# Patient Record
Sex: FEMALE | Race: WHITE | NOT HISPANIC OR LATINO | Employment: UNEMPLOYED | URBAN - METROPOLITAN AREA
[De-identification: names, ages, dates, MRNs, and addresses within clinical notes are randomized per-mention and may not be internally consistent; named-entity substitution may affect disease eponyms.]

---

## 2023-08-22 ENCOUNTER — NURSING HOME VISIT (OUTPATIENT)
Dept: POST ACUTE CARE | Facility: EXTERNAL LOCATION | Age: 58
End: 2023-08-22
Payer: COMMERCIAL

## 2023-08-22 DIAGNOSIS — R52 PAIN: ICD-10-CM

## 2023-08-22 DIAGNOSIS — G47.00 INSOMNIA, UNSPECIFIED TYPE: ICD-10-CM

## 2023-08-22 DIAGNOSIS — Z86.73 HISTORY OF LACUNAR CEREBROVASCULAR ACCIDENT: ICD-10-CM

## 2023-08-22 DIAGNOSIS — R53.81 PHYSICAL DECONDITIONING: ICD-10-CM
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
## 2023-08-22 DIAGNOSIS — Z79.899 ON DEEP VEIN THROMBOSIS (DVT) PROPHYLAXIS: ICD-10-CM

## 2023-08-22 DIAGNOSIS — R30.0 DYSURIA: ICD-10-CM

## 2023-08-22 DIAGNOSIS — I10 HYPERTENSION, ESSENTIAL: ICD-10-CM

## 2023-08-22 DIAGNOSIS — E11.9 TYPE 2 DIABETES MELLITUS WITHOUT COMPLICATION, WITHOUT LONG-TERM CURRENT USE OF INSULIN (MULTI): ICD-10-CM

## 2023-08-22 DIAGNOSIS — F32.A DEPRESSION, UNSPECIFIED DEPRESSION TYPE: ICD-10-CM

## 2023-08-22 DIAGNOSIS — E78.5 DYSLIPIDEMIA: ICD-10-CM

## 2023-08-22 DIAGNOSIS — K59.00 CONSTIPATION, UNSPECIFIED CONSTIPATION TYPE: ICD-10-CM

## 2023-08-22 DIAGNOSIS — M62.838 MUSCLE SPASM: ICD-10-CM

## 2023-08-22 PROCEDURE — 99309 SBSQ NF CARE MODERATE MDM 30: CPT | Performed by: NURSE PRACTITIONER
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
## 2023-08-23 VITALS
DIASTOLIC BLOOD PRESSURE: 88 MMHG | WEIGHT: 185 LBS | HEART RATE: 87 BPM | TEMPERATURE: 98.2 F | RESPIRATION RATE: 15 BRPM | OXYGEN SATURATION: 95 % | SYSTOLIC BLOOD PRESSURE: 132 MMHG

## 2023-08-23 PROBLEM — E78.5 DYSLIPIDEMIA: Status: ACTIVE | Noted: 2022-12-28

## 2023-08-23 PROBLEM — F32.A DEPRESSION: Status: ACTIVE | Noted: 2022-06-29

## 2023-08-23 PROBLEM — Z79.899 ON DEEP VEIN THROMBOSIS (DVT) PROPHYLAXIS: Status: ACTIVE | Noted: 2023-08-23
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
## 2023-08-23 PROBLEM — M62.838 MUSCLE SPASM: Status: ACTIVE | Noted: 2023-08-23

## 2023-08-23 PROBLEM — I44.7 LBBB (LEFT BUNDLE BRANCH BLOCK): Status: ACTIVE | Noted: 2022-07-18

## 2023-08-23 PROBLEM — R47.01 EXPRESSIVE APHASIA: Status: ACTIVE | Noted: 2023-02-27

## 2023-08-23 PROBLEM — F39 MOOD DISORDER (CMS-HCC): Status: ACTIVE | Noted: 2022-06-29

## 2023-08-23 PROBLEM — R52 PAIN: Status: ACTIVE | Noted: 2023-08-23

## 2023-08-23 PROBLEM — R30.0 DYSURIA: Status: ACTIVE | Noted: 2023-08-23

## 2023-08-23 PROBLEM — R53.81 PHYSICAL DECONDITIONING: Status: ACTIVE | Noted: 2023-08-23

## 2023-08-23 PROBLEM — K59.00 CONSTIPATION: Status: ACTIVE | Noted: 2023-08-23

## 2023-08-23 PROBLEM — G47.00 INSOMNIA: Status: ACTIVE | Noted: 2023-08-23

## 2023-08-23 PROBLEM — F41.9 CHRONIC ANXIETY: Status: ACTIVE | Noted: 2023-05-31

## 2023-08-23 PROBLEM — Z86.73 HISTORY OF LACUNAR CEREBROVASCULAR ACCIDENT: Status: ACTIVE | Noted: 2023-02-27

## 2023-08-23 PROBLEM — E11.9 TYPE 2 DIABETES MELLITUS WITHOUT COMPLICATION, WITHOUT LONG-TERM CURRENT USE OF INSULIN (MULTI): Status: ACTIVE | Noted: 2023-08-23

## 2023-08-23 NOTE — ASSESSMENT & PLAN NOTE
Oxycodone 10 mg by mouth every 6 hrs as needed x 14 days   Acetaminophen 650 mg by mouth every 6 hrs as needed
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
## 2023-08-23 NOTE — ASSESSMENT & PLAN NOTE
Lovenox 30 mg subcutaneous BID  BLE Venous Doppler 8/17/2023 (-)   Repeat LLE Venous doppler (Increasing left calf pain)
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
## 2023-08-29 ENCOUNTER — NURSING HOME VISIT (OUTPATIENT)
Dept: POST ACUTE CARE | Facility: EXTERNAL LOCATION | Age: 58
End: 2023-08-29
Payer: COMMERCIAL

## 2023-08-29 DIAGNOSIS — E11.9 TYPE 2 DIABETES MELLITUS WITHOUT COMPLICATION, WITHOUT LONG-TERM CURRENT USE OF INSULIN (MULTI): ICD-10-CM

## 2023-08-29 DIAGNOSIS — N30.00 ACUTE CYSTITIS WITHOUT HEMATURIA: ICD-10-CM

## 2023-08-29 DIAGNOSIS — I10 HYPERTENSION, ESSENTIAL: Primary | ICD-10-CM
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
## 2023-08-29 DIAGNOSIS — R53.81 PHYSICAL DECONDITIONING: ICD-10-CM

## 2023-08-29 PROCEDURE — 99308 SBSQ NF CARE LOW MDM 20: CPT | Performed by: NURSE PRACTITIONER
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
## 2023-08-31 VITALS
OXYGEN SATURATION: 96 % | TEMPERATURE: 98 F | WEIGHT: 183 LBS | RESPIRATION RATE: 16 BRPM | SYSTOLIC BLOOD PRESSURE: 142 MMHG | HEART RATE: 78 BPM | DIASTOLIC BLOOD PRESSURE: 80 MMHG

## 2023-08-31 PROBLEM — N30.00 ACUTE CYSTITIS WITHOUT HEMATURIA: Status: ACTIVE | Noted: 2023-08-31

## 2023-08-31 NOTE — ASSESSMENT & PLAN NOTE
Tirzepatide 10 mg subcutaneous every Monday    Glucose checks BID (Before breakfast and dinner) and chart in PCC
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
## 2023-08-31 NOTE — ASSESSMENT & PLAN NOTE
UA positive  Insufficient amt urine to run culture  Macrobid 100 mg by mouth BID (Stop date 9/3/2023)
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
## 2023-10-09 ENCOUNTER — APPOINTMENT (OUTPATIENT)
Dept: ORTHOPEDIC SURGERY | Facility: CLINIC | Age: 58
End: 2023-10-09
Payer: COMMERCIAL

## 2023-10-13 PROBLEM — S32.9XXA PELVIC FRACTURE (MULTI): Status: ACTIVE | Noted: 2023-10-13

## 2023-10-13 PROBLEM — S32.402D: Status: ACTIVE | Noted: 2023-10-13

## 2023-10-13 PROBLEM — S22.41XD CLOSED FRACTURE OF MULTIPLE RIBS OF RIGHT SIDE WITH ROUTINE HEALING: Status: ACTIVE | Noted: 2023-10-13

## 2023-10-13 PROBLEM — T80.1XXS: Status: ACTIVE | Noted: 2023-10-13

## 2023-10-13 PROBLEM — S32.401D: Status: ACTIVE | Noted: 2023-10-13

## 2023-10-13 RX ORDER — DULOXETIN HYDROCHLORIDE 30 MG/1
30 CAPSULE, DELAYED RELEASE ORAL DAILY
COMMUNITY
Start: 2023-07-28

## 2023-10-13 RX ORDER — LISINOPRIL AND HYDROCHLOROTHIAZIDE 10; 12.5 MG/1; MG/1
1 TABLET ORAL DAILY
COMMUNITY

## 2023-10-13 RX ORDER — SENNOSIDES 8.6 MG/1
TABLET ORAL
COMMUNITY

## 2023-10-13 RX ORDER — ACETAMINOPHEN 325 MG/1
TABLET ORAL
COMMUNITY

## 2023-10-13 RX ORDER — ATORVASTATIN CALCIUM 80 MG/1
80 TABLET, FILM COATED ORAL DAILY
COMMUNITY
Start: 2023-02-21

## 2023-10-13 RX ORDER — OMEPRAZOLE 20 MG/1
20 CAPSULE, DELAYED RELEASE ORAL AS NEEDED
COMMUNITY
Start: 2022-12-08

## 2023-10-13 RX ORDER — ALBUTEROL SULFATE 90 UG/1
2 AEROSOL, METERED RESPIRATORY (INHALATION)
COMMUNITY
Start: 2016-03-21

## 2023-10-13 RX ORDER — METHOCARBAMOL 500 MG/1
TABLET, FILM COATED ORAL
COMMUNITY

## 2023-10-13 RX ORDER — DULOXETIN HYDROCHLORIDE 60 MG/1
60 CAPSULE, DELAYED RELEASE ORAL DAILY
COMMUNITY

## 2023-10-13 RX ORDER — SODIUM CHLORIDE 0.9 % (FLUSH) 0.9 %
SYRINGE (ML) INJECTION
COMMUNITY
Start: 2023-02-21 | End: 2024-05-22

## 2023-10-13 RX ORDER — IPRATROPIUM BROMIDE AND ALBUTEROL SULFATE 2.5; .5 MG/3ML; MG/3ML
3 SOLUTION RESPIRATORY (INHALATION) 4 TIMES DAILY PRN
COMMUNITY

## 2023-10-13 RX ORDER — ATOMOXETINE 25 MG/1
25 CAPSULE ORAL EVERY MORNING
COMMUNITY
Start: 2022-11-06

## 2023-10-13 RX ORDER — ENOXAPARIN SODIUM 100 MG/ML
INJECTION SUBCUTANEOUS
COMMUNITY

## 2023-10-13 RX ORDER — ASPIRIN 81 MG/1
81 TABLET ORAL DAILY
COMMUNITY
Start: 2023-01-10

## 2023-10-13 RX ORDER — TIRZEPATIDE 2.5 MG/.5ML
2.5 INJECTION, SOLUTION SUBCUTANEOUS
COMMUNITY
Start: 2023-01-10

## 2023-10-13 RX ORDER — ESCITALOPRAM OXALATE 10 MG/1
1 TABLET ORAL DAILY
COMMUNITY

## 2023-10-13 RX ORDER — BUPRENORPHINE HYDROCHLORIDE AND NALOXONE HYDROCHLORIDE DIHYDRATE 8; 2 MG/1; MG/1
1 TABLET SUBLINGUAL 2 TIMES DAILY
COMMUNITY

## 2023-10-13 RX ORDER — TIRZEPATIDE 5 MG/.5ML
5 INJECTION, SOLUTION SUBCUTANEOUS
COMMUNITY
Start: 2023-03-21

## 2023-10-13 RX ORDER — POLYETHYLENE GLYCOL 3350 17 G/17G
POWDER, FOR SOLUTION ORAL
COMMUNITY

## 2023-10-13 RX ORDER — OXYCODONE HCL 10 MG/1
TABLET, FILM COATED, EXTENDED RELEASE ORAL
COMMUNITY

## 2023-10-13 RX ORDER — METFORMIN HYDROCHLORIDE 500 MG/1
2 TABLET, EXTENDED RELEASE ORAL
COMMUNITY
Start: 2022-12-09

## 2023-10-13 RX ORDER — TALC
POWDER (GRAM) TOPICAL
COMMUNITY

## 2023-10-13 RX ORDER — IPRATROPIUM BROMIDE 0.5 MG/2.5ML
2.5 SOLUTION RESPIRATORY (INHALATION) 4 TIMES DAILY
COMMUNITY
Start: 2013-07-29

## 2023-10-13 RX ORDER — ATOMOXETINE 40 MG/1
40 CAPSULE ORAL EVERY MORNING
COMMUNITY
Start: 2023-03-23

## 2023-10-13 RX ORDER — PROPRANOLOL HYDROCHLORIDE 60 MG/1
60 CAPSULE, EXTENDED RELEASE ORAL DAILY
COMMUNITY
Start: 2023-06-29

## 2023-10-13 RX ORDER — LOSARTAN POTASSIUM 25 MG/1
3 TABLET ORAL DAILY
COMMUNITY
Start: 2023-02-28

## 2023-10-13 RX ORDER — OMEPRAZOLE 40 MG/1
40 CAPSULE, DELAYED RELEASE ORAL DAILY
COMMUNITY

## 2023-10-16 ENCOUNTER — OFFICE VISIT (OUTPATIENT)
Dept: ORTHOPEDIC SURGERY | Facility: CLINIC | Age: 58
End: 2023-10-16
Payer: COMMERCIAL

## 2023-10-16 ENCOUNTER — APPOINTMENT (OUTPATIENT)
Dept: ORTHOPEDIC SURGERY | Facility: CLINIC | Age: 58
End: 2023-10-16
Payer: COMMERCIAL

## 2023-10-16 ENCOUNTER — ANCILLARY PROCEDURE (OUTPATIENT)
Dept: RADIOLOGY | Facility: CLINIC | Age: 58
End: 2023-10-16
Payer: COMMERCIAL

## 2023-10-16 VITALS — HEIGHT: 59 IN | WEIGHT: 183 LBS | BODY MASS INDEX: 36.89 KG/M2

## 2023-10-16 DIAGNOSIS — S32.432D CLOSED DISPLACED FRACTURE OF ANTERIOR COLUMN OF LEFT ACETABULUM WITH ROUTINE HEALING: ICD-10-CM

## 2023-10-16 DIAGNOSIS — Z87.81 HISTORY OF PELVIC FRACTURE: ICD-10-CM

## 2023-10-16 DIAGNOSIS — S32.82XD MULTIPLE CLOSED UNSTABLE LATERAL COMPRESSION FRACTURES OF PELVIS WITH ROUTINE HEALING: Primary | ICD-10-CM

## 2023-10-16 PROBLEM — S61.402A OPEN WOUND OF LEFT HAND: Status: ACTIVE | Noted: 2023-09-18

## 2023-10-16 PROBLEM — L03.90 WOUND CELLULITIS: Status: ACTIVE | Noted: 2023-09-10

## 2023-10-16 PROBLEM — J18.9 COMMUNITY ACQUIRED PNEUMONIA OF RIGHT LOWER LOBE OF LUNG: Status: ACTIVE | Noted: 2023-10-02

## 2023-10-16 PROCEDURE — 72190 X-RAY EXAM OF PELVIS: CPT | Performed by: RADIOLOGY

## 2023-10-16 PROCEDURE — 1036F TOBACCO NON-USER: CPT | Performed by: ORTHOPAEDIC SURGERY

## 2023-10-16 PROCEDURE — 99024 POSTOP FOLLOW-UP VISIT: CPT | Performed by: ORTHOPAEDIC SURGERY

## 2023-10-16 PROCEDURE — 4010F ACE/ARB THERAPY RXD/TAKEN: CPT | Performed by: ORTHOPAEDIC SURGERY

## 2023-10-16 PROCEDURE — 72190 X-RAY EXAM OF PELVIS: CPT | Mod: FY

## 2023-10-16 ASSESSMENT — PAIN - FUNCTIONAL ASSESSMENT: PAIN_FUNCTIONAL_ASSESSMENT: NO/DENIES PAIN

## 2023-10-16 NOTE — PROGRESS NOTES
Subjective    Patient ID: Ashley Kessler is a 58 y.o. female.    Chief Complaint: Follow-up of the Pelvis     Last Surgery: Percutaneous posterior pelvic fixation and anterior external fixator application by Dr. Quintero on August 5, 2023 and external fixator removal and right acetabular Open fixation of the left percutaneous acetabular fixation by Dr. May on August 10, 2023       HPI  Patient is doing well.  She has been compliant nonweightbearing.  Patient reported that she longer has any pain.  She is currently home.  She is anxious to begin weightbearing.  No numbness or tingling down the leg.  Overall she is very happy  Objective   Ortho Exam  Gen: The patient is alert and oriented ×3, is in no acute distress, and appear their stated age and weight.      Patient sitting in the wheelchair today.  Her pelvic is stable.  No pain with compression of the pelvis.  No pain or rotation range of motion of the hip.  Incisions are well-healed.  Normal movement and sensation in the leg.  Image Results:  I personally reviewed her pelvic radiograph today shows stable fixation of the pelvis.  Fracture line no longer visible.  There is interval callus formation.  No hip arthrosis.  Assessment/Plan   Encounter Diagnoses:  History of pelvic fracture  Patient has radiographic healing of fracture.  At this time she may begin weightbearing as tolerated using a walker.  I referred her to outpatient physical therapy for range of motion and strengthening exercises.  She may transition from a assistive device as tolerated.  Distress importance of fall prevention at home.  Patient will come back and see me in about 2 months to obtain x-ray of the pelvis 5 views.  Orders Placed This Encounter    XR pelvis 3+ views     No follow-ups on file.
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
## 2023-10-25 ENCOUNTER — EVALUATION (OUTPATIENT)
Dept: PHYSICAL THERAPY | Facility: CLINIC | Age: 58
End: 2023-10-25
Payer: COMMERCIAL

## 2023-10-25 DIAGNOSIS — R52 PAIN: Primary | ICD-10-CM

## 2023-10-25 DIAGNOSIS — R53.81 PHYSICAL DECONDITIONING: ICD-10-CM

## 2023-10-25 DIAGNOSIS — S32.9XXA: ICD-10-CM

## 2023-10-25 PROCEDURE — 97163 PT EVAL HIGH COMPLEX 45 MIN: CPT | Mod: GP | Performed by: PHYSICAL THERAPIST
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
## 2023-10-25 NOTE — Clinical Note
October 25, 2023     Patient: Ashley Kessler   YOB: 1965   Date of Visit: 10/25/2023       To Whom It May Concern:    It is my medical opinion that Ashley Kessler {Work release (duty restriction):25623}.    If you have any questions or concerns, please don't hesitate to call.         Sincerely,        Catarina Pickering, PT    CC: No Recipients

## 2023-10-25 NOTE — Clinical Note
October 25, 2023     Patient: Ashley Kessler   YOB: 1965   Date of Visit: 10/25/2023       To Whom it May Concern:    Ashley Kessler was seen in my clinic on 10/25/2023. She {Return to school/sport:18143}.    If you have any questions or concerns, please don't hesitate to call.         Sincerely,          Catarina Pickering, PT        CC: No Recipients

## 2023-11-01 ENCOUNTER — DOCUMENTATION (OUTPATIENT)
Dept: PHYSICAL THERAPY | Facility: CLINIC | Age: 58
End: 2023-11-01
Payer: COMMERCIAL

## 2023-11-01 NOTE — PROGRESS NOTES
[facility]                 Therapy Communication Note    Patient Name: Ashley Kessler  MRN: [id number]  Today's Date: 11/1/2023     Discipline: Physical Therapy    Missed Visit Reason:      Missed Time: No Show    Comment:

## 2023-11-07 ENCOUNTER — TREATMENT (OUTPATIENT)
Dept: PHYSICAL THERAPY | Facility: CLINIC | Age: 58
End: 2023-11-07
Payer: COMMERCIAL

## 2023-11-07 DIAGNOSIS — S32.9XXA: ICD-10-CM

## 2023-11-07 DIAGNOSIS — R53.81 PHYSICAL DECONDITIONING: ICD-10-CM

## 2023-11-07 DIAGNOSIS — R52 PAIN: ICD-10-CM

## 2023-11-07 PROCEDURE — 97140 MANUAL THERAPY 1/> REGIONS: CPT | Mod: GP | Performed by: PHYSICAL THERAPIST

## 2023-11-07 PROCEDURE — 97110 THERAPEUTIC EXERCISES: CPT | Mod: GP | Performed by: PHYSICAL THERAPIST

## 2023-11-07 NOTE — PROGRESS NOTES
Physical Therapy Treatment     Patient Name: Ashley Kessler  MRN: [id number]  Today's Date: 11/7/2023  Time Calculation  Start Time: 1300  Stop Time: 1345  Time Calculation (min): 45 min    Visit Number:  2       Current Problem  1. Pelvic fracture (CMS/HCC)  Follow Up In Physical Therapy      2. Pain  Follow Up In Physical Therapy      3. Physical deconditioning  Follow Up In Physical Therapy          Precautions   WBAT    Pain   0/10    Subjective/General   The pt had a good day and was able to walk a lot without the walker, but was tired and in bed most of the next day.    Balance is good good, but sometimes lacks confidence    Missed last session due to no power    Objective  Treatment:    Therapeutic Exercise:  35 minutes  Recumbent biking : 3 minutes at level 1    Slant board 3 x30 seconds  Hamstring stretch 3 x30 seconds each    Seated LE exercise for strengthening   LAQ 1 x10 with 2 #  Marching 1 x10 2 #  Hamstring curl  2 x10  Abduction with Green theraband  2 x10  ball squeeze  2 x10  ankle df/pf  2 x10    Standing:  with UE support for strengthening:  Hip flexion/marching   1 x10 with 0 #  Hip extension 1 x10 with 0 #  Hip abduction 1 x10 with 0 #      Air ex foam  Small squat 1 x10   Toe raise1 x10   Eyes closed x2  Tapping hands to target      Manual Therapy:  10 minutes  STM to B quads in supported hook lying and hip flexor release      OP EDUCATION:   Heat as needed     Assessment:     Thept was able to do all exercises with few rests, but was stiff and sore after.  Able to stand fairly erect, but not lay flat in supine    End of session pain rating:  mild    Plan:  Progress as tolerated    Assessment of current progress against goals:  Insufficient treatment time to assess progress  Goals:   +  Increased hip ROM with 0 extension,,  45 ER, and 0 degrees knee extension  +  Able to perform sit to stand and bed mobility independently without UE support and without increased pain  +  The pt will have 5/5  strength in the LE's as needed for hip stability in function  +  The pt will ambulate with least restrictive device  for 300 feet modified independently   +  The pt will be independent in ascending and descending stairs as needed to enter her home with least restrictive device independently in home without increased pain.   +  Improve LE flexibility in hamstrings and hip flexors to reduce stress on the joints and spine  + Reduce soft tissue restriction as needed to reduce pain and prevent further injury  + Independent in [facility] for long term self-management and overall health and well being   + assess balance and achieve low fall risk on Tinetti or Coffey

## 2023-11-09 ENCOUNTER — TREATMENT (OUTPATIENT)
Dept: PHYSICAL THERAPY | Facility: CLINIC | Age: 58
End: 2023-11-09
Payer: COMMERCIAL

## 2023-11-09 DIAGNOSIS — S32.9XXA: ICD-10-CM

## 2023-11-09 DIAGNOSIS — R53.81 PHYSICAL DECONDITIONING: ICD-10-CM

## 2023-11-09 DIAGNOSIS — R52 PAIN: ICD-10-CM

## 2023-11-09 PROCEDURE — 97110 THERAPEUTIC EXERCISES: CPT | Mod: CQ,GP

## 2023-11-09 PROCEDURE — 97140 MANUAL THERAPY 1/> REGIONS: CPT | Mod: GP,CQ

## 2023-11-09 NOTE — PROGRESS NOTES
Physical Therapy    Physical Therapy Treatment    Patient Name: Ashley Kessler  MRN: [id number]  Today's Date: 11/9/2023        Patient Name: Ashley Kessler  MRN: [id number]  Time Calculation  Start Time: 1145  Stop Time: 1230  Time Calculation (min): 45 min     Visit Number:  3         Current Problem  1. Pelvic fracture (CMS/HCC)  Follow Up In Physical Therapy      2. Pain  Follow Up In Physical Therapy      3. Physical deconditioning  Follow Up In Physical Therapy        Precautions   WBAT     Pain   0/10     Subjective/General   The pt had a good day and was able to walk a lot without the walker, but was tired and in bed most of the next day.    Balance is good good, but sometimes lacks confidence  L hip discomfort noted but not painful mostly with amb     ObjectiveObjective  Treatment:     Therapeutic Exercise:  30 minutes  Recumbent biking : 3 minutes at level 1     Slant board 3 x30 seconds  Hamstring stretch 3 x30 seconds each     Seated LE exercise for strengthening   LAQ 1 x10 with 2 #  Marching 1 x10 2 #  Hamstring curl  2 x10  Abduction with Green theraband  2 x10  ball squeeze  2 x10  ankle df/pf  2 x10     Standing:  with UE support for strengthening:  Hip flexion/marching   1 x10 with 0 #  Hip extension 1 x10 with 0 #  Hip abduction 1 x10 with 0 #  Mini squats/ toe raises      Air ex foam  Small squat 1 x10   Toe raise1 x10   Eyes closed x2  Tapping hands to target   Supine: SAQ, heel slides , SLR add-abd, hooklying LTR, marching 1 x 10 each  Manual Therapy:  15 minutes  STM to B quads in supported hook lying and hip flexor release    OP EDUCATION:   Heat as needed      Assessment:  Thept was able to do all exercises with few rests, but was stiff and sore after.  Able to stand fairly erect, but not lay flat in supine ,  slight discomfort with SLR due to weakness     End of session pain rating:  mild     Plan:  Progress as tolerated         End of session pain rating:  mild     Plan:  Progress as tolerated      Goals:   +  Increased hip ROM with 0 extension,,  45 ER, and 0 degrees knee extension  +  Able to perform sit to stand and bed mobility independently without UE support and without increased pain  +  The pt will have 5/5 strength in the LE's as needed for hip stability in function  +  The pt will ambulate with least restrictive device  for 300 feet modified independently   +  The pt will be independent in ascending and descending stairs as needed to enter her home with least restrictive device independently in home without increased pain.   +  Improve LE flexibility in hamstrings and hip flexors to reduce stress on the joints and spine  + Reduce soft tissue restriction as needed to reduce pain and prevent further injury  + Independent in [facility] for long term self-management and overall health and well being   + assess balance and achieve low fall risk on Tinetti or Coffey

## 2023-11-14 ENCOUNTER — APPOINTMENT (OUTPATIENT)
Dept: PHYSICAL THERAPY | Facility: CLINIC | Age: 58
End: 2023-11-14
Payer: COMMERCIAL

## 2023-11-16 ENCOUNTER — TREATMENT (OUTPATIENT)
Dept: PHYSICAL THERAPY | Facility: CLINIC | Age: 58
End: 2023-11-16
Payer: COMMERCIAL

## 2023-11-16 DIAGNOSIS — S32.9XXA: ICD-10-CM

## 2023-11-16 DIAGNOSIS — R53.81 PHYSICAL DECONDITIONING: ICD-10-CM

## 2023-11-16 DIAGNOSIS — R52 PAIN: ICD-10-CM

## 2023-11-16 PROCEDURE — 97110 THERAPEUTIC EXERCISES: CPT | Mod: GP | Performed by: PHYSICAL THERAPIST

## 2023-11-16 NOTE — PROGRESS NOTES
Physical Therapy Treatment     Patient Name: Ashley Kessler  MRN: [id number]  Today's Date: 11/16/2023  Time Calculation  Start Time: 1315  Stop Time: 1345  Time Calculation (min): 30 min    Visit Number:  4       Current Problem  1. Pelvic fracture (CMS/HCC)  Follow Up In Physical Therapy      2. Pain  Follow Up In Physical Therapy      3. Physical deconditioning  Follow Up In Physical Therapy          Precautions   WBAT    Pain   0/10  Pain ranges  from 0/10  to 4/10    Subjective/General   She decided to get rid of the walker. She wakes up with pain, but it works out as she walks.  She hopes to start working from home on Monday.     Yesterday she had intense pulling in the hamstrings.She woke up with the R knee hurting a lot.        Objective  Pt 15 minutes late to therapy but agreeable to short treatment    Treatment:  Exercises 30 mintues  Recumbent biking : 5 minutes at level 1 seat 7     Slant board 3 x30 seconds  Hamstring stretch 3 x30 seconds each     Seated LE exercise for strengthening   LAQ 1 x10 with 0 #  Marching 1 x10 BTB  Hamstring curl  2 x10  Abduction with Blue theraband  2 x10  ball squeeze  2 x10  ankle df/pf  2 x10     Standing:  with UE support for strengthening:  Hip flexion/marching   1 x10 with 0 #  Hip extension 1 x10 with BTB  Hip abduction 1 x10 with BTB  Mini squats/ toe raises      Air ex foam  +Head nods and turns   + Eyes closed x2    Assessment:   The pt has huge progress and entered PT with no device.  Exercise progressed with resistance added to exercise.  She is apropriate at this time to return to work from home with the ability to move around as needed.      End of session pain rating:  not increased    Plan:  Progress as tolerated        Assessment of current progress against goals:  Progressing toward functional goals  Goals:    +  Increased hip ROM with 0 extension,,  45 ER, and 0 degrees knee extension  +  Able to perform sit to stand and bed mobility independently without UE  support and without increased pain  +  The pt will have 5/5 strength in the LE's as needed for hip stability in function  +  The pt will ambulate with least restrictive device  for 300 feet modified independently   +  The pt will be independent in ascending and descending stairs as needed to enter her home with least restrictive device independently in home without increased pain.   +  Improve LE flexibility in hamstrings and hip flexors to reduce stress on the joints and spine  + Reduce soft tissue restriction as needed to reduce pain and prevent further injury  + Independent in [facility] for long term self-management and overall health and well being   + assess balance and achieve low fall risk on Tinetti or Coffey

## 2023-11-20 ENCOUNTER — APPOINTMENT (OUTPATIENT)
Dept: PHYSICAL THERAPY | Facility: CLINIC | Age: 58
End: 2023-11-20
Payer: COMMERCIAL

## 2023-11-22 ENCOUNTER — APPOINTMENT (OUTPATIENT)
Dept: PHYSICAL THERAPY | Facility: CLINIC | Age: 58
End: 2023-11-22
Payer: COMMERCIAL

## 2023-11-28 ENCOUNTER — APPOINTMENT (OUTPATIENT)
Dept: PHYSICAL THERAPY | Facility: CLINIC | Age: 58
End: 2023-11-28
Payer: COMMERCIAL

## 2023-11-28 ENCOUNTER — DOCUMENTATION (OUTPATIENT)
Dept: PHYSICAL THERAPY | Facility: CLINIC | Age: 58
End: 2023-11-28

## 2023-11-28 NOTE — PROGRESS NOTES
Physical Therapy                 Therapy Communication Note    Patient Name: Ashley Kessler  MRN: [id number]  Today's Date: 11/28/2023     Discipline: Physical Therapy    Missed Visit Reason:      Missed Time: No Show    Comment: no call, but bad weather today

## 2023-12-05 ENCOUNTER — DOCUMENTATION (OUTPATIENT)
Dept: PHYSICAL THERAPY | Facility: CLINIC | Age: 58
End: 2023-12-05

## 2023-12-05 NOTE — PROGRESS NOTES
Physical Therapy                 Therapy Communication Note    Patient Name: Ashley Kessler  MRN: [id number]  Today's Date: 12/5/2023     Discipline: Physical Therapy    Missed Visit Reason:      Missed Time: No Show    Comment:

## 2024-01-08 ENCOUNTER — ANCILLARY PROCEDURE (OUTPATIENT)
Dept: RADIOLOGY | Facility: CLINIC | Age: 59
End: 2024-01-08
Payer: COMMERCIAL

## 2024-01-08 ENCOUNTER — OFFICE VISIT (OUTPATIENT)
Dept: ORTHOPEDIC SURGERY | Facility: CLINIC | Age: 59
End: 2024-01-08
Payer: COMMERCIAL

## 2024-01-08 DIAGNOSIS — Z87.81 HISTORY OF PELVIC FRACTURE: ICD-10-CM

## 2024-01-08 PROCEDURE — 72190 X-RAY EXAM OF PELVIS: CPT

## 2024-01-08 PROCEDURE — 99214 OFFICE O/P EST MOD 30 MIN: CPT | Performed by: ORTHOPAEDIC SURGERY

## 2024-01-08 PROCEDURE — 72190 X-RAY EXAM OF PELVIS: CPT | Performed by: RADIOLOGY

## 2024-01-08 PROCEDURE — 4010F ACE/ARB THERAPY RXD/TAKEN: CPT | Performed by: ORTHOPAEDIC SURGERY

## 2024-01-08 PROCEDURE — 1036F TOBACCO NON-USER: CPT | Performed by: ORTHOPAEDIC SURGERY

## 2024-01-08 RX ORDER — GABAPENTIN 300 MG/1
300 CAPSULE ORAL 3 TIMES DAILY
Qty: 90 CAPSULE | Refills: 0 | Status: SHIPPED | OUTPATIENT
Start: 2024-01-08 | End: 2024-02-07

## 2024-01-08 RX ORDER — NALOXONE HYDROCHLORIDE 4 MG/.1ML
4 SPRAY NASAL AS NEEDED
Qty: 2 EACH | Refills: 0 | Status: SHIPPED | OUTPATIENT
Start: 2024-01-08 | End: 2024-01-09

## 2024-01-08 RX ORDER — CYCLOBENZAPRINE HCL 10 MG
10 TABLET ORAL 3 TIMES DAILY PRN
Qty: 90 TABLET | Refills: 0 | Status: SHIPPED | OUTPATIENT
Start: 2024-01-08 | End: 2024-02-07

## 2024-01-08 NOTE — PROGRESS NOTES
"Subjective    Patient ID: Ashley Kessler is a 58 y.o. female.    Chief Complaint: Follow up of the pelvis     Last Surgery: Percutaneous posterior pelvic fixation and anterior external fixator application by Dr. Quintero on August 5, 2023 and external fixator removal and right acetabular Open fixation of the left percutaneous acetabular fixation by dr. May on August 10th, 2023      HPI  Patient is a 58 y.o. female who is 5 months s/p external fixator removal and right acetabular open fixation of the left percutaneous acetabular fixation.  Date of surgery was 8/10/2023.  Patient is now weight bearing as tolerated. Patient reported new right hip pain and left leg pain after injury in December. Patient stated at [facility] ED that the pain started after \"play fighting\" or wrestling with significant other and he sat on her hips. In the office today, patient states that it occurred after falling in her driveway. Patient states that the pain is sharp and burning on the right anterior thigh and that it is sharp and tingling radiating down the whole leg into the toes on the left leg. States that the pain makes it difficult to walk. Patient denies fever, chills, swelling, shortness of breath, and chest pain.     ROS: All other systems have been reviewed and are negative except as previously noted in history of present illness.      IMP:  Problem List Items Addressed This Visit    None  Visit Diagnoses       History of pelvic fracture        Relevant Medications    cyclobenzaprine (Flexeril) 10 mg tablet    gabapentin (Neurontin) 300 mg capsule    naloxone (Narcan) 4 mg/0.1 mL nasal spray    Other Relevant Orders    XR pelvis 3+ views            Objective   General: Alert and oriented x 3, NAD, respirations easy and unlabored with no audible wheezes, skin warm and dry, speech and dress appropriate for noted age, affect euthymic.     Musculoskeletal: bilateral lower extremities  incisions c/d/i  No swelling to lower " leg  compartments soft  no calf tenderness  sensation intact to light touch  motor intact including TA/GS/EHL  palpable DP/PT pulses 2+   Full ROM    X-ray: Images of 5 view pelvis reviewed personally by me today and reveal maintenance of alignment of lateral compression fractures with hardware in position and no interval change. Severe osteoarthritis of lower lumbar spine is noted.      Assessment/Plan   Encounter Diagnoses:  History of pelvic fracture    PLAN: Patient is 5 months s/p external fixator removal and right acetabular open fixation of the left percutaneous acetabular fixation. Patient had an injury back in December that is now causing her pain in both legs. The pain is sharp and burning and travels down her whole leg in the left leg. Imaging shows stable hardware in position and severe osteoarthritis of the lumbar spine. Patient was educated that this is most likely nerve pain that can be due to the arthritis that is occurring her spine. Patient was educated that gabapentin is a medication that is prescribed for nerve pain and can help to relieve the pain she is currently having. Patient was interested in taking this medication and it was prescribed. As well the patient was having right leg muscle spasms, so flexiril 10 mg was prescribed. Patient was also given a physical therapy referral for ROM and core exercises to help alleviate bilateral leg pain. The patient will follow up in 3 months for xrays and to evaluate bilateral leg pain. Patient is in agreement of this plan. Xrays of 5+ view pelvis will be needed at next visit.     Orders Placed This Encounter    XR pelvis 3+ views    cyclobenzaprine (Flexeril) 10 mg tablet    gabapentin (Neurontin) 300 mg capsule    naloxone (Narcan) 4 mg/0.1 mL nasal spray     No follow-ups on file.

## 2024-01-08 NOTE — LETTER
January 8, 2024     Patient: Ashley Kessler   YOB: 1965   Date of Visit: 1/8/2024       To Whom It May Concern:    It is my medical opinion that Ashley Kessler should remain out of work until 02/05/2024 .    If you have any questions or concerns, please don't hesitate to call.         Sincerely,        Dhiraj May MD    CC: No Recipients

## 2024-01-10 ENCOUNTER — DOCUMENTATION (OUTPATIENT)
Dept: PHYSICAL THERAPY | Facility: CLINIC | Age: 59
End: 2024-01-10
Payer: COMMERCIAL

## 2024-01-10 NOTE — PROGRESS NOTES
Physical Therapy    Discharge Summary        Discharge Information:   Discahrge date: 1/10/24  Date of last treatment: 11/16/23  Date of evaluation 10/25/23  Number of sessions 3    Referred for: pelvic fracture, pain, and deconditioning  Referred by: Dr. May    Therapy Summary:   No formal re-assessment due to unexpected discharge.   See note on last attended treatment for status    Discharge Status:   Goals were partially met with significant progress noted upon last session    Rehab Discharge Reason:   Self discharge.  The pt did not show for last 2 scheduled sessions    Discharge from PT at this time

## 2024-07-30 ENCOUNTER — HOSPITAL ENCOUNTER (OUTPATIENT)
Facility: HOSPITAL | Age: 59
Setting detail: OBSERVATION
LOS: 1 days | Discharge: HOME | End: 2024-08-01
Attending: STUDENT IN AN ORGANIZED HEALTH CARE EDUCATION/TRAINING PROGRAM | Admitting: INTERNAL MEDICINE
Payer: MEDICAID

## 2024-07-30 ENCOUNTER — APPOINTMENT (OUTPATIENT)
Dept: CARDIOLOGY | Facility: HOSPITAL | Age: 59
End: 2024-07-30
Payer: MEDICAID

## 2024-07-30 ENCOUNTER — APPOINTMENT (OUTPATIENT)
Dept: RADIOLOGY | Facility: HOSPITAL | Age: 59
End: 2024-07-30
Payer: MEDICAID

## 2024-07-30 DIAGNOSIS — R07.9 CHEST PAIN: Primary | ICD-10-CM

## 2024-07-30 LAB
ALBUMIN SERPL-MCNC: 4.2 G/DL (ref 3.5–5)
ALP BLD-CCNC: 82 U/L (ref 35–125)
ALT SERPL-CCNC: 11 U/L (ref 5–40)
ANION GAP SERPL CALC-SCNC: 13 MMOL/L
AST SERPL-CCNC: 13 U/L (ref 5–40)
BASOPHILS # BLD AUTO: 0.06 X10*3/UL (ref 0–0.1)
BASOPHILS NFR BLD AUTO: 0.6 %
BILIRUB SERPL-MCNC: 0.3 MG/DL (ref 0.1–1.2)
BUN SERPL-MCNC: 11 MG/DL (ref 8–25)
CALCIUM SERPL-MCNC: 9.3 MG/DL (ref 8.5–10.4)
CHLORIDE SERPL-SCNC: 100 MMOL/L (ref 97–107)
CO2 SERPL-SCNC: 26 MMOL/L (ref 24–31)
CREAT SERPL-MCNC: 0.5 MG/DL (ref 0.4–1.6)
D DIMER PPP FEU-MCNC: 0.52 MG/L FEU (ref 0.19–0.5)
EGFRCR SERPLBLD CKD-EPI 2021: >90 ML/MIN/1.73M*2
EOSINOPHIL # BLD AUTO: 0.33 X10*3/UL (ref 0–0.7)
EOSINOPHIL NFR BLD AUTO: 3.3 %
ERYTHROCYTE [DISTWIDTH] IN BLOOD BY AUTOMATED COUNT: 13.1 % (ref 11.5–14.5)
GLUCOSE BLD MANUAL STRIP-MCNC: 126 MG/DL (ref 74–99)
GLUCOSE SERPL-MCNC: 197 MG/DL (ref 65–99)
HCT VFR BLD AUTO: 41.4 % (ref 36–46)
HGB BLD-MCNC: 13.6 G/DL (ref 12–16)
IMM GRANULOCYTES # BLD AUTO: 0.06 X10*3/UL (ref 0–0.7)
IMM GRANULOCYTES NFR BLD AUTO: 0.6 % (ref 0–0.9)
LYMPHOCYTES # BLD AUTO: 2.76 X10*3/UL (ref 1.2–4.8)
LYMPHOCYTES NFR BLD AUTO: 27.2 %
MAGNESIUM SERPL-MCNC: 1.9 MG/DL (ref 1.6–3.1)
MCH RBC QN AUTO: 30.1 PG (ref 26–34)
MCHC RBC AUTO-ENTMCNC: 32.9 G/DL (ref 32–36)
MCV RBC AUTO: 92 FL (ref 80–100)
MONOCYTES # BLD AUTO: 0.6 X10*3/UL (ref 0.1–1)
MONOCYTES NFR BLD AUTO: 5.9 %
NEUTROPHILS # BLD AUTO: 6.32 X10*3/UL (ref 1.2–7.7)
NEUTROPHILS NFR BLD AUTO: 62.4 %
NRBC BLD-RTO: 0 /100 WBCS (ref 0–0)
PLATELET # BLD AUTO: 316 X10*3/UL (ref 150–450)
POTASSIUM SERPL-SCNC: 3.9 MMOL/L (ref 3.4–5.1)
PROT SERPL-MCNC: 7.1 G/DL (ref 5.9–7.9)
RBC # BLD AUTO: 4.52 X10*6/UL (ref 4–5.2)
SODIUM SERPL-SCNC: 139 MMOL/L (ref 133–145)
TROPONIN T SERPL-MCNC: 10 NG/L
TROPONIN T SERPL-MCNC: 11 NG/L
TROPONIN T SERPL-MCNC: 9 NG/L
WBC # BLD AUTO: 10.1 X10*3/UL (ref 4.4–11.3)

## 2024-07-30 PROCEDURE — 2500000001 HC RX 250 WO HCPCS SELF ADMINISTERED DRUGS (ALT 637 FOR MEDICARE OP): Performed by: INTERNAL MEDICINE

## 2024-07-30 PROCEDURE — 71275 CT ANGIOGRAPHY CHEST: CPT | Performed by: RADIOLOGY

## 2024-07-30 PROCEDURE — 93005 ELECTROCARDIOGRAM TRACING: CPT

## 2024-07-30 PROCEDURE — 85025 COMPLETE CBC W/AUTO DIFF WBC: CPT | Performed by: STUDENT IN AN ORGANIZED HEALTH CARE EDUCATION/TRAINING PROGRAM

## 2024-07-30 PROCEDURE — 84484 ASSAY OF TROPONIN QUANT: CPT | Performed by: STUDENT IN AN ORGANIZED HEALTH CARE EDUCATION/TRAINING PROGRAM

## 2024-07-30 PROCEDURE — 71275 CT ANGIOGRAPHY CHEST: CPT

## 2024-07-30 PROCEDURE — 2500000004 HC RX 250 GENERAL PHARMACY W/ HCPCS (ALT 636 FOR OP/ED): Performed by: STUDENT IN AN ORGANIZED HEALTH CARE EDUCATION/TRAINING PROGRAM

## 2024-07-30 PROCEDURE — 71045 X-RAY EXAM CHEST 1 VIEW: CPT | Performed by: RADIOLOGY

## 2024-07-30 PROCEDURE — 84075 ASSAY ALKALINE PHOSPHATASE: CPT | Performed by: STUDENT IN AN ORGANIZED HEALTH CARE EDUCATION/TRAINING PROGRAM

## 2024-07-30 PROCEDURE — 83735 ASSAY OF MAGNESIUM: CPT | Performed by: STUDENT IN AN ORGANIZED HEALTH CARE EDUCATION/TRAINING PROGRAM

## 2024-07-30 PROCEDURE — 36415 COLL VENOUS BLD VENIPUNCTURE: CPT | Performed by: STUDENT IN AN ORGANIZED HEALTH CARE EDUCATION/TRAINING PROGRAM

## 2024-07-30 PROCEDURE — 93010 ELECTROCARDIOGRAM REPORT: CPT | Performed by: INTERNAL MEDICINE

## 2024-07-30 PROCEDURE — 99285 EMERGENCY DEPT VISIT HI MDM: CPT

## 2024-07-30 PROCEDURE — 85300 ANTITHROMBIN III ACTIVITY: CPT | Performed by: STUDENT IN AN ORGANIZED HEALTH CARE EDUCATION/TRAINING PROGRAM

## 2024-07-30 PROCEDURE — 71045 X-RAY EXAM CHEST 1 VIEW: CPT

## 2024-07-30 PROCEDURE — 96374 THER/PROPH/DIAG INJ IV PUSH: CPT | Mod: 59

## 2024-07-30 PROCEDURE — 82947 ASSAY GLUCOSE BLOOD QUANT: CPT | Mod: 59

## 2024-07-30 PROCEDURE — 2550000001 HC RX 255 CONTRASTS: Performed by: STUDENT IN AN ORGANIZED HEALTH CARE EDUCATION/TRAINING PROGRAM

## 2024-07-30 PROCEDURE — G0378 HOSPITAL OBSERVATION PER HR: HCPCS

## 2024-07-30 RX ORDER — ATORVASTATIN CALCIUM 80 MG/1
80 TABLET, FILM COATED ORAL NIGHTLY
Status: DISCONTINUED | OUTPATIENT
Start: 2024-07-30 | End: 2024-08-01 | Stop reason: HOSPADM

## 2024-07-30 RX ORDER — FENTANYL CITRATE 50 UG/ML
50 INJECTION, SOLUTION INTRAMUSCULAR; INTRAVENOUS ONCE
Status: COMPLETED | OUTPATIENT
Start: 2024-07-30 | End: 2024-07-30

## 2024-07-30 RX ORDER — ATOMOXETINE 25 MG/1
25 CAPSULE ORAL EVERY MORNING
Status: DISCONTINUED | OUTPATIENT
Start: 2024-07-31 | End: 2024-07-30

## 2024-07-30 RX ORDER — ESCITALOPRAM OXALATE 10 MG/1
10 TABLET ORAL DAILY
Status: DISCONTINUED | OUTPATIENT
Start: 2024-07-31 | End: 2024-08-01 | Stop reason: HOSPADM

## 2024-07-30 RX ORDER — ACETAMINOPHEN 325 MG/1
650 TABLET ORAL EVERY 4 HOURS PRN
Status: DISCONTINUED | OUTPATIENT
Start: 2024-07-30 | End: 2024-08-01 | Stop reason: HOSPADM

## 2024-07-30 RX ORDER — POLYETHYLENE GLYCOL 3350 17 G/17G
17 POWDER, FOR SOLUTION ORAL DAILY PRN
Status: DISCONTINUED | OUTPATIENT
Start: 2024-07-30 | End: 2024-08-01 | Stop reason: HOSPADM

## 2024-07-30 RX ORDER — GUAIFENESIN/DEXTROMETHORPHAN 100-10MG/5
5 SYRUP ORAL EVERY 4 HOURS PRN
Status: DISCONTINUED | OUTPATIENT
Start: 2024-07-30 | End: 2024-08-01 | Stop reason: HOSPADM

## 2024-07-30 RX ORDER — PANTOPRAZOLE SODIUM 20 MG/1
20 TABLET, DELAYED RELEASE ORAL DAILY
Status: DISCONTINUED | OUTPATIENT
Start: 2024-07-31 | End: 2024-08-01 | Stop reason: HOSPADM

## 2024-07-30 RX ORDER — GABAPENTIN 300 MG/1
300 CAPSULE ORAL 3 TIMES DAILY
COMMUNITY

## 2024-07-30 RX ORDER — GABAPENTIN 300 MG/1
300 CAPSULE ORAL EVERY 8 HOURS PRN
Status: DISCONTINUED | OUTPATIENT
Start: 2024-07-30 | End: 2024-08-01

## 2024-07-30 RX ORDER — GUAIFENESIN 600 MG/1
600 TABLET, EXTENDED RELEASE ORAL EVERY 12 HOURS PRN
Status: DISCONTINUED | OUTPATIENT
Start: 2024-07-30 | End: 2024-08-01 | Stop reason: HOSPADM

## 2024-07-30 RX ORDER — ONDANSETRON HYDROCHLORIDE 2 MG/ML
4 INJECTION, SOLUTION INTRAVENOUS EVERY 8 HOURS PRN
Status: DISCONTINUED | OUTPATIENT
Start: 2024-07-30 | End: 2024-08-01 | Stop reason: HOSPADM

## 2024-07-30 RX ORDER — ENOXAPARIN SODIUM 100 MG/ML
40 INJECTION SUBCUTANEOUS DAILY
Status: DISCONTINUED | OUTPATIENT
Start: 2024-07-31 | End: 2024-08-01 | Stop reason: HOSPADM

## 2024-07-30 RX ORDER — DULOXETIN HYDROCHLORIDE 30 MG/1
30 CAPSULE, DELAYED RELEASE ORAL DAILY
Status: DISCONTINUED | OUTPATIENT
Start: 2024-07-31 | End: 2024-08-01 | Stop reason: HOSPADM

## 2024-07-30 RX ORDER — DEXTROSE 50 % IN WATER (D50W) INTRAVENOUS SYRINGE
25
Status: DISCONTINUED | OUTPATIENT
Start: 2024-07-30 | End: 2024-08-01 | Stop reason: HOSPADM

## 2024-07-30 RX ORDER — ACETAMINOPHEN 160 MG/5ML
650 SOLUTION ORAL EVERY 4 HOURS PRN
Status: DISCONTINUED | OUTPATIENT
Start: 2024-07-30 | End: 2024-08-01 | Stop reason: HOSPADM

## 2024-07-30 RX ORDER — ARIPIPRAZOLE 5 MG/1
5 TABLET ORAL DAILY
COMMUNITY

## 2024-07-30 RX ORDER — ONDANSETRON 4 MG/1
4 TABLET, FILM COATED ORAL EVERY 8 HOURS PRN
Status: DISCONTINUED | OUTPATIENT
Start: 2024-07-30 | End: 2024-08-01 | Stop reason: HOSPADM

## 2024-07-30 RX ORDER — ASPIRIN 81 MG/1
81 TABLET ORAL DAILY
Status: DISCONTINUED | OUTPATIENT
Start: 2024-07-31 | End: 2024-08-01 | Stop reason: HOSPADM

## 2024-07-30 RX ORDER — ARIPIPRAZOLE 5 MG/1
5 TABLET ORAL DAILY
Status: DISCONTINUED | OUTPATIENT
Start: 2024-07-31 | End: 2024-08-01

## 2024-07-30 RX ORDER — DEXTROSE 50 % IN WATER (D50W) INTRAVENOUS SYRINGE
12.5
Status: DISCONTINUED | OUTPATIENT
Start: 2024-07-30 | End: 2024-08-01 | Stop reason: HOSPADM

## 2024-07-30 RX ORDER — INSULIN LISPRO 100 [IU]/ML
0-5 INJECTION, SOLUTION INTRAVENOUS; SUBCUTANEOUS
Status: DISCONTINUED | OUTPATIENT
Start: 2024-07-30 | End: 2024-08-01 | Stop reason: HOSPADM

## 2024-07-30 RX ORDER — ALBUTEROL SULFATE 0.83 MG/ML
2.5 SOLUTION RESPIRATORY (INHALATION) EVERY 4 HOURS PRN
Status: DISCONTINUED | OUTPATIENT
Start: 2024-07-30 | End: 2024-07-31

## 2024-07-30 RX ORDER — ACETAMINOPHEN 650 MG/1
650 SUPPOSITORY RECTAL EVERY 4 HOURS PRN
Status: DISCONTINUED | OUTPATIENT
Start: 2024-07-30 | End: 2024-08-01 | Stop reason: HOSPADM

## 2024-07-30 RX ORDER — HYDROCODONE BITARTRATE AND ACETAMINOPHEN 5; 325 MG/1; MG/1
1 TABLET ORAL EVERY 6 HOURS PRN
Status: DISCONTINUED | OUTPATIENT
Start: 2024-07-30 | End: 2024-08-01 | Stop reason: HOSPADM

## 2024-07-30 SDOH — SOCIAL STABILITY: SOCIAL INSECURITY: WERE YOU ABLE TO COMPLETE ALL THE BEHAVIORAL HEALTH SCREENINGS?: YES

## 2024-07-30 SDOH — SOCIAL STABILITY: SOCIAL INSECURITY: HAS ANYONE EVER THREATENED TO HURT YOUR FAMILY OR YOUR PETS?: NO

## 2024-07-30 SDOH — SOCIAL STABILITY: SOCIAL INSECURITY: ABUSE: ADULT

## 2024-07-30 SDOH — SOCIAL STABILITY: SOCIAL INSECURITY: DO YOU FEEL ANYONE HAS EXPLOITED OR TAKEN ADVANTAGE OF YOU FINANCIALLY OR OF YOUR PERSONAL PROPERTY?: NO

## 2024-07-30 SDOH — SOCIAL STABILITY: SOCIAL INSECURITY: DO YOU FEEL UNSAFE GOING BACK TO THE PLACE WHERE YOU ARE LIVING?: NO

## 2024-07-30 SDOH — SOCIAL STABILITY: SOCIAL INSECURITY: HAVE YOU HAD THOUGHTS OF HARMING ANYONE ELSE?: NO

## 2024-07-30 SDOH — SOCIAL STABILITY: SOCIAL INSECURITY: DOES ANYONE TRY TO KEEP YOU FROM HAVING/CONTACTING OTHER FRIENDS OR DOING THINGS OUTSIDE YOUR HOME?: NO

## 2024-07-30 SDOH — SOCIAL STABILITY: SOCIAL INSECURITY: HAVE YOU HAD ANY THOUGHTS OF HARMING ANYONE ELSE?: NO

## 2024-07-30 SDOH — SOCIAL STABILITY: SOCIAL INSECURITY: ARE THERE ANY APPARENT SIGNS OF INJURIES/BEHAVIORS THAT COULD BE RELATED TO ABUSE/NEGLECT?: NO

## 2024-07-30 SDOH — SOCIAL STABILITY: SOCIAL INSECURITY: ARE YOU OR HAVE YOU BEEN THREATENED OR ABUSED PHYSICALLY, EMOTIONALLY, OR SEXUALLY BY ANYONE?: NO

## 2024-07-30 ASSESSMENT — COGNITIVE AND FUNCTIONAL STATUS - GENERAL
MOBILITY SCORE: 24
PATIENT BASELINE BEDBOUND: NO
DAILY ACTIVITIY SCORE: 24

## 2024-07-30 ASSESSMENT — ACTIVITIES OF DAILY LIVING (ADL)
PATIENT'S MEMORY ADEQUATE TO SAFELY COMPLETE DAILY ACTIVITIES?: NO
JUDGMENT_ADEQUATE_SAFELY_COMPLETE_DAILY_ACTIVITIES: YES
GROOMING: INDEPENDENT
FEEDING YOURSELF: INDEPENDENT
HEARING - LEFT EAR: FUNCTIONAL
HEARING - RIGHT EAR: FUNCTIONAL
BATHING: INDEPENDENT
TOILETING: INDEPENDENT
LACK_OF_TRANSPORTATION: NO
WALKS IN HOME: INDEPENDENT
ADEQUATE_TO_COMPLETE_ADL: YES
DRESSING YOURSELF: INDEPENDENT

## 2024-07-30 ASSESSMENT — PAIN SCALES - GENERAL
PAINLEVEL_OUTOF10: 2
PAINLEVEL_OUTOF10: 6
PAINLEVEL_OUTOF10: 0 - NO PAIN
PAINLEVEL_OUTOF10: 7

## 2024-07-30 ASSESSMENT — PATIENT HEALTH QUESTIONNAIRE - PHQ9
2. FEELING DOWN, DEPRESSED OR HOPELESS: NOT AT ALL
1. LITTLE INTEREST OR PLEASURE IN DOING THINGS: NOT AT ALL
SUM OF ALL RESPONSES TO PHQ9 QUESTIONS 1 & 2: 0

## 2024-07-30 ASSESSMENT — PAIN DESCRIPTION - DESCRIPTORS
DESCRIPTORS: ACHING;DULL
DESCRIPTORS: ACHING;DULL

## 2024-07-30 ASSESSMENT — LIFESTYLE VARIABLES
HOW OFTEN DO YOU HAVE A DRINK CONTAINING ALCOHOL: MONTHLY OR LESS
SKIP TO QUESTIONS 9-10: 1
AUDIT-C TOTAL SCORE: 1
AUDIT-C TOTAL SCORE: 1
HOW OFTEN DO YOU HAVE 6 OR MORE DRINKS ON ONE OCCASION: NEVER
HOW MANY STANDARD DRINKS CONTAINING ALCOHOL DO YOU HAVE ON A TYPICAL DAY: 1 OR 2

## 2024-07-30 ASSESSMENT — COLUMBIA-SUICIDE SEVERITY RATING SCALE - C-SSRS
2. HAVE YOU ACTUALLY HAD ANY THOUGHTS OF KILLING YOURSELF?: NO
1. IN THE PAST MONTH, HAVE YOU WISHED YOU WERE DEAD OR WISHED YOU COULD GO TO SLEEP AND NOT WAKE UP?: NO
6. HAVE YOU EVER DONE ANYTHING, STARTED TO DO ANYTHING, OR PREPARED TO DO ANYTHING TO END YOUR LIFE?: NO

## 2024-07-30 ASSESSMENT — PAIN - FUNCTIONAL ASSESSMENT
PAIN_FUNCTIONAL_ASSESSMENT: 0-10

## 2024-07-30 ASSESSMENT — PAIN DESCRIPTION - LOCATION
LOCATION: CHEST
LOCATION: ARM

## 2024-07-30 ASSESSMENT — PAIN DESCRIPTION - PAIN TYPE: TYPE: ACUTE PAIN

## 2024-07-30 NOTE — ED TRIAGE NOTES
Pt is having chest pain with elft arm pain. Hx of bumdle branch block. Asa and a nitroglycerin given by ems

## 2024-07-30 NOTE — ED PROVIDER NOTES
HPI   Chief Complaint   Patient presents with    Chest Pain       Patient is a 59-year-old female that presents emergency room for evaluation of left-sided chest, neck and arm pain.  Patient states the pain started yesterday evening.  It is since been progressing up to the left arm into the neck and across the upper left side of the chest.  Patient states is worse with certain movements.  She denies numbness or tingling extremities, weakness of the extremities, abdominal pain, nausea, vomiting, fevers or chills.  Patient states he has never had pain like this before.  She denies any prior cardiac history.  EMS performed EKG and activated a code STEMI prior to arrival.  Patient was given aspirin and nitro prior to arrival with minimal improvement of symptoms.      History provided by:  Patient and EMS personnel          Patient History   No past medical history on file.  Past Surgical History:   Procedure Laterality Date    MR HEAD ANGIO WO IV CONTRAST  1/3/2023    MR HEAD ANGIO WO IV CONTRAST 1/3/2023 [facility]CTOR [facility]    MR NECK ANGIO WO IV CONTRAST  1/3/2023    MR NECK ANGIO WO IV CONTRAST 1/3/2023 DOCTOR OFFICE [facility]GAC[facility]     Family History   Family history unknown: Yes     Social History     Tobacco Use    Smoking status: Former     Current packs/day: 0.50     Types: Cigarettes    Smokeless tobacco: Never   Substance Use Topics    Alcohol use: Not Currently    Drug use: Not Currently       Physical Exam   ED Triage Vitals [07/30/24 1541]   Temperature Heart Rate Respirations BP   36.5 °C (97.7 °F) 90 18 (!) 114/98      Pulse Ox Temp src Heart Rate Source Patient Position   94 % -- Monitor Lying      BP Location FiO2 (%)     Right arm --       Physical Exam  Vitals and nursing note reviewed.   Constitutional:       General: She is not in acute distress.     Appearance: She is well-developed. She is not ill-appearing.   HENT:      Head: Normocephalic and atraumatic.   Eyes:      Extraocular Movements: Extraocular  movements intact.      Pupils: Pupils are equal, round, and reactive to light.   Neck:      Vascular: No JVD.   Cardiovascular:      Rate and Rhythm: Normal rate and regular rhythm.      Pulses:           Radial pulses are 2+ on the right side and 2+ on the left side.   Pulmonary:      Effort: No tachypnea.      Breath sounds: No decreased breath sounds, wheezing, rhonchi or rales.   Musculoskeletal:      Cervical back: Normal range of motion.      Right lower leg: No edema.      Left lower leg: No edema.   Skin:     General: Skin is warm and dry.   Neurological:      General: No focal deficit present.      Mental Status: She is alert.       Recent Results (from the past 24 hour(s))   CBC with Differential    Collection Time: 07/30/24  3:43 PM   Result Value Ref Range    WBC 10.1 4.4 - 11.3 x10*3/uL    nRBC 0.0 0.0 - 0.0 /100 WBCs    RBC 4.52 4.00 - 5.20 x10*6/uL    Hemoglobin 13.6 12.0 - 16.0 g/dL    Hematocrit 41.4 36.0 - 46.0 %    MCV 92 80 - 100 fL    MCH 30.1 26.0 - 34.0 pg    MCHC 32.9 32.0 - 36.0 g/dL    RDW 13.1 11.5 - 14.5 %    Platelets 316 150 - 450 x10*3/uL    Neutrophils % 62.4 40.0 - 80.0 %    Immature Granulocytes %, Automated 0.6 0.0 - 0.9 %    Lymphocytes % 27.2 13.0 - 44.0 %    Monocytes % 5.9 2.0 - 10.0 %    Eosinophils % 3.3 0.0 - 6.0 %    Basophils % 0.6 0.0 - 2.0 %    Neutrophils Absolute 6.32 1.20 - 7.70 x10*3/uL    Immature Granulocytes Absolute, Automated 0.06 0.00 - 0.70 x10*3/uL    Lymphocytes Absolute 2.76 1.20 - 4.80 x10*3/uL    Monocytes Absolute 0.60 0.10 - 1.00 x10*3/uL    Eosinophils Absolute 0.33 0.00 - 0.70 x10*3/uL    Basophils Absolute 0.06 0.00 - 0.10 x10*3/uL   Comprehensive Metabolic Panel    Collection Time: 07/30/24  3:43 PM   Result Value Ref Range    Glucose 197 (H) 65 - 99 mg/dL    Sodium 139 133 - 145 mmol/L    Potassium 3.9 3.4 - 5.1 mmol/L    Chloride 100 97 - 107 mmol/L    Bicarbonate 26 24 - 31 mmol/L    Urea Nitrogen 11 8 - 25 mg/dL    Creatinine 0.50 0.40 - 1.60  mg/dL    eGFR >90 >60 mL/min/1.73m*2    Calcium 9.3 8.5 - 10.4 mg/dL    Albumin 4.2 3.5 - 5.0 g/dL    Alkaline Phosphatase 82 35 - 125 U/L    Total Protein 7.1 5.9 - 7.9 g/dL    AST 13 5 - 40 U/L    Bilirubin, Total 0.3 0.1 - 1.2 mg/dL    ALT 11 5 - 40 U/L    Anion Gap 13 <=19 mmol/L   Serial Troponin, Initial ([facility])    Collection Time: 07/30/24  3:43 PM   Result Value Ref Range    Troponin T, High Sensitivity 10 <=14 ng/L   Magnesium    Collection Time: 07/30/24  3:43 PM   Result Value Ref Range    Magnesium 1.90 1.60 - 3.10 mg/dL   D-dimer, quantitative    Collection Time: 07/30/24  3:48 PM   Result Value Ref Range    D-Dimer Non VTE, Quant (mg/L FEU) 0.52 (H) 0.19 - 0.50 mg/L FEU   Serial Troponin, 2 Hour ([facility])    Collection Time: 07/30/24  6:06 PM   Result Value Ref Range    Troponin T, High Sensitivity 9 <=14 ng/L   POCT GLUCOSE    Collection Time: 07/30/24  9:55 PM   Result Value Ref Range    POCT Glucose 126 (H) 74 - 99 mg/dL   Serial Troponin, 6 Hour ([facility])    Collection Time: 07/30/24 10:01 PM   Result Value Ref Range    Troponin T, High Sensitivity 11 <=14 ng/L         ED Course & MDM   ED Course as of 07/30/24 2342   Tue Jul 30, 2024 1536 EKG Time:1536  EKG Interpretation time:1536  EKG Interpretation: EKG shows normal sinus rhythm with a rate of 91 beats minute, left bundle branch block, left axis deviation, QTc 482, no evidence of STEMI.    EKG was interpreted by myself independently [JL]      ED Course User Index  [JL] Jez Todd DO         Diagnoses as of 07/30/24 2342   Chest pain                       [place] Coma Scale Score: 15                        Medical Decision Making  Patient is a 59-year-old female that presents emergency room for evaluation of chest pain.  Patient uncomfortable appearing but in no obvious distress on initial presentation.  EKG shows evidence of a left bundle branch block however reviewing patient's chart this was present on prior EKG and there is no evidence of  active STEMI at this time however the left bundle branch block has evolved slightly since prior EKG.  In discussion with cardiologist given the left bundle branch block is not new the code STEMI was canceled at this time.  Blood work ordered including CBC, CMP, troponin, magnesium, D-dimer along with a chest x-ray.  Blood work was remarkable for normal troponin of 10, it did remain flat on repeat testing at 9 and 11 respectively.  Electrolytes are stable at this time.  D-dimer minimally elevated at 0.52 and a CT angio of the chest was ordered to rule out pulmonary embolism.  CT angio of the chest does show some nonspecific mosaic pattern edema versus infiltrate however patient has no infectious type symptoms and I have very low suspicion for pneumonia at this time.  No evidence of pulmonary embolism on CT angio of the chest.  Patient was given IV morphine with improvement of her symptoms and is resting comfortably on reevaluation.  Discussed with cardiologist patient will be brought in for observation and further cardiac evaluation given the abnormal EKG.  Case was also discussed with hospitalist who accepted patient for observation.        Procedure  Procedures     Jez Todd,   07/30/24 4151

## 2024-07-31 ENCOUNTER — APPOINTMENT (OUTPATIENT)
Dept: RESPIRATORY THERAPY | Facility: HOSPITAL | Age: 59
End: 2024-07-31
Payer: MEDICAID

## 2024-07-31 ENCOUNTER — APPOINTMENT (OUTPATIENT)
Dept: RADIOLOGY | Facility: HOSPITAL | Age: 59
End: 2024-07-31
Payer: MEDICAID

## 2024-07-31 ENCOUNTER — APPOINTMENT (OUTPATIENT)
Dept: CARDIOLOGY | Facility: HOSPITAL | Age: 59
End: 2024-07-31
Payer: MEDICAID

## 2024-07-31 LAB
ALBUMIN SERPL-MCNC: 3.9 G/DL (ref 3.5–5)
ALP BLD-CCNC: 74 U/L (ref 35–125)
ALT SERPL-CCNC: 9 U/L (ref 5–40)
ANION GAP SERPL CALC-SCNC: 12 MMOL/L
AST SERPL-CCNC: 10 U/L (ref 5–40)
ATRIAL RATE: 91 BPM
BASOPHILS # BLD AUTO: 0.06 X10*3/UL (ref 0–0.1)
BASOPHILS NFR BLD AUTO: 0.8 %
BILIRUB SERPL-MCNC: 0.4 MG/DL (ref 0.1–1.2)
BUN SERPL-MCNC: 12 MG/DL (ref 8–25)
CALCIUM SERPL-MCNC: 9.4 MG/DL (ref 8.5–10.4)
CHLORIDE SERPL-SCNC: 103 MMOL/L (ref 97–107)
CHOLEST SERPL-MCNC: 179 MG/DL (ref 133–200)
CHOLEST/HDLC SERPL: 3.5 {RATIO}
CO2 SERPL-SCNC: 26 MMOL/L (ref 24–31)
CREAT SERPL-MCNC: 0.4 MG/DL (ref 0.4–1.6)
EGFRCR SERPLBLD CKD-EPI 2021: >90 ML/MIN/1.73M*2
EOSINOPHIL # BLD AUTO: 0.38 X10*3/UL (ref 0–0.7)
EOSINOPHIL NFR BLD AUTO: 5.3 %
ERYTHROCYTE [DISTWIDTH] IN BLOOD BY AUTOMATED COUNT: 13 % (ref 11.5–14.5)
EST. AVERAGE GLUCOSE BLD GHB EST-MCNC: 157 MG/DL
GLUCOSE BLD MANUAL STRIP-MCNC: 153 MG/DL (ref 74–99)
GLUCOSE BLD MANUAL STRIP-MCNC: 159 MG/DL (ref 74–99)
GLUCOSE BLD MANUAL STRIP-MCNC: 165 MG/DL (ref 74–99)
GLUCOSE BLD MANUAL STRIP-MCNC: 188 MG/DL (ref 74–99)
GLUCOSE SERPL-MCNC: 145 MG/DL (ref 65–99)
HBA1C MFR BLD: 7.1 %
HCT VFR BLD AUTO: 43 % (ref 36–46)
HDLC SERPL-MCNC: 51 MG/DL
HGB BLD-MCNC: 13.8 G/DL (ref 12–16)
IMM GRANULOCYTES # BLD AUTO: 0.04 X10*3/UL (ref 0–0.7)
IMM GRANULOCYTES NFR BLD AUTO: 0.6 % (ref 0–0.9)
LDLC SERPL CALC-MCNC: 98 MG/DL (ref 65–130)
LYMPHOCYTES # BLD AUTO: 2.8 X10*3/UL (ref 1.2–4.8)
LYMPHOCYTES NFR BLD AUTO: 39.4 %
MCH RBC QN AUTO: 29.4 PG (ref 26–34)
MCHC RBC AUTO-ENTMCNC: 32.1 G/DL (ref 32–36)
MCV RBC AUTO: 92 FL (ref 80–100)
MONOCYTES # BLD AUTO: 0.59 X10*3/UL (ref 0.1–1)
MONOCYTES NFR BLD AUTO: 8.3 %
NEUTROPHILS # BLD AUTO: 3.24 X10*3/UL (ref 1.2–7.7)
NEUTROPHILS NFR BLD AUTO: 45.6 %
NRBC BLD-RTO: 0 /100 WBCS (ref 0–0)
P AXIS: 4 DEGREES
P OFFSET: 199 MS
P ONSET: 144 MS
PLATELET # BLD AUTO: 304 X10*3/UL (ref 150–450)
POTASSIUM SERPL-SCNC: 4.1 MMOL/L (ref 3.4–5.1)
PR INTERVAL: 140 MS
PROT SERPL-MCNC: 6.5 G/DL (ref 5.9–7.9)
Q ONSET: 214 MS
QRS COUNT: 15 BEATS
QRS DURATION: 132 MS
QT INTERVAL: 392 MS
QTC CALCULATION(BAZETT): 482 MS
QTC FREDERICIA: 450 MS
R AXIS: -4 DEGREES
RBC # BLD AUTO: 4.69 X10*6/UL (ref 4–5.2)
SODIUM SERPL-SCNC: 141 MMOL/L (ref 133–145)
T AXIS: 113 DEGREES
T OFFSET: 410 MS
TRIGL SERPL-MCNC: 150 MG/DL (ref 40–150)
VENTRICULAR RATE: 91 BPM
WBC # BLD AUTO: 7.1 X10*3/UL (ref 4.4–11.3)

## 2024-07-31 PROCEDURE — 73030 X-RAY EXAM OF SHOULDER: CPT | Mod: LEFT SIDE | Performed by: RADIOLOGY

## 2024-07-31 PROCEDURE — 2500000002 HC RX 250 W HCPCS SELF ADMINISTERED DRUGS (ALT 637 FOR MEDICARE OP, ALT 636 FOR OP/ED): Performed by: INTERNAL MEDICINE

## 2024-07-31 PROCEDURE — 73030 X-RAY EXAM OF SHOULDER: CPT | Mod: LT

## 2024-07-31 PROCEDURE — 9420000001 HC RT PATIENT EDUCATION 5 MIN

## 2024-07-31 PROCEDURE — G0378 HOSPITAL OBSERVATION PER HR: HCPCS

## 2024-07-31 PROCEDURE — 94640 AIRWAY INHALATION TREATMENT: CPT

## 2024-07-31 PROCEDURE — 80053 COMPREHEN METABOLIC PANEL: CPT | Performed by: INTERNAL MEDICINE

## 2024-07-31 PROCEDURE — 94664 DEMO&/EVAL PT USE INHALER: CPT | Mod: 59

## 2024-07-31 PROCEDURE — 2500000001 HC RX 250 WO HCPCS SELF ADMINISTERED DRUGS (ALT 637 FOR MEDICARE OP): Performed by: INTERNAL MEDICINE

## 2024-07-31 PROCEDURE — 94060 EVALUATION OF WHEEZING: CPT

## 2024-07-31 PROCEDURE — 2500000004 HC RX 250 GENERAL PHARMACY W/ HCPCS (ALT 636 FOR OP/ED): Performed by: INTERNAL MEDICINE

## 2024-07-31 PROCEDURE — 93005 ELECTROCARDIOGRAM TRACING: CPT

## 2024-07-31 PROCEDURE — 80061 LIPID PANEL: CPT | Performed by: INTERNAL MEDICINE

## 2024-07-31 PROCEDURE — 36415 COLL VENOUS BLD VENIPUNCTURE: CPT | Performed by: INTERNAL MEDICINE

## 2024-07-31 PROCEDURE — 82947 ASSAY GLUCOSE BLOOD QUANT: CPT | Mod: 59

## 2024-07-31 PROCEDURE — 2500000002 HC RX 250 W HCPCS SELF ADMINISTERED DRUGS (ALT 637 FOR MEDICARE OP, ALT 636 FOR OP/ED)

## 2024-07-31 PROCEDURE — 85025 COMPLETE CBC W/AUTO DIFF WBC: CPT | Performed by: INTERNAL MEDICINE

## 2024-07-31 PROCEDURE — 83036 HEMOGLOBIN GLYCOSYLATED A1C: CPT | Performed by: INTERNAL MEDICINE

## 2024-07-31 PROCEDURE — 94640 AIRWAY INHALATION TREATMENT: CPT | Mod: 76

## 2024-07-31 PROCEDURE — 73080 X-RAY EXAM OF ELBOW: CPT | Mod: LT

## 2024-07-31 PROCEDURE — 96375 TX/PRO/DX INJ NEW DRUG ADDON: CPT

## 2024-07-31 PROCEDURE — 73080 X-RAY EXAM OF ELBOW: CPT | Mod: LEFT SIDE | Performed by: RADIOLOGY

## 2024-07-31 RX ORDER — FLUTICASONE PROPIONATE 50 MCG
2 SPRAY, SUSPENSION (ML) NASAL DAILY
Status: DISCONTINUED | OUTPATIENT
Start: 2024-07-31 | End: 2024-08-01 | Stop reason: HOSPADM

## 2024-07-31 RX ORDER — ALBUTEROL SULFATE 0.83 MG/ML
SOLUTION RESPIRATORY (INHALATION)
Status: COMPLETED
Start: 2024-07-31 | End: 2024-07-31

## 2024-07-31 RX ORDER — FLUTICASONE FUROATE AND VILANTEROL 200; 25 UG/1; UG/1
1 POWDER RESPIRATORY (INHALATION)
Status: DISCONTINUED | OUTPATIENT
Start: 2024-07-31 | End: 2024-08-01 | Stop reason: HOSPADM

## 2024-07-31 RX ORDER — IPRATROPIUM BROMIDE AND ALBUTEROL SULFATE 2.5; .5 MG/3ML; MG/3ML
3 SOLUTION RESPIRATORY (INHALATION) EVERY 2 HOUR PRN
Status: DISCONTINUED | OUTPATIENT
Start: 2024-07-31 | End: 2024-08-01 | Stop reason: HOSPADM

## 2024-07-31 RX ORDER — HYDRALAZINE HYDROCHLORIDE 20 MG/ML
5 INJECTION INTRAMUSCULAR; INTRAVENOUS EVERY 6 HOURS PRN
Status: DISCONTINUED | OUTPATIENT
Start: 2024-07-31 | End: 2024-08-01 | Stop reason: HOSPADM

## 2024-07-31 RX ORDER — IPRATROPIUM BROMIDE AND ALBUTEROL SULFATE 2.5; .5 MG/3ML; MG/3ML
3 SOLUTION RESPIRATORY (INHALATION)
Status: DISCONTINUED | OUTPATIENT
Start: 2024-07-31 | End: 2024-07-31

## 2024-07-31 SDOH — HEALTH STABILITY: MENTAL HEALTH
HOW OFTEN DO YOU NEED TO HAVE SOMEONE HELP YOU WHEN YOU READ INSTRUCTIONS, PAMPHLETS, OR OTHER WRITTEN MATERIAL FROM YOUR DOCTOR OR PHARMACY?: NEVER

## 2024-07-31 SDOH — HEALTH STABILITY: MENTAL HEALTH
STRESS IS WHEN SOMEONE FEELS TENSE, NERVOUS, ANXIOUS, OR CAN'T SLEEP AT NIGHT BECAUSE THEIR MIND IS TROUBLED. HOW STRESSED ARE YOU?: RATHER MUCH

## 2024-07-31 SDOH — SOCIAL STABILITY: SOCIAL INSECURITY
WITHIN THE LAST YEAR, HAVE YOU BEEN KICKED, HIT, SLAPPED, OR OTHERWISE PHYSICALLY HURT BY YOUR PARTNER OR EX-PARTNER?: NO

## 2024-07-31 SDOH — HEALTH STABILITY: MENTAL HEALTH: HOW OFTEN DO YOU HAVE 6 OR MORE DRINKS ON ONE OCCASION?: NEVER

## 2024-07-31 SDOH — SOCIAL STABILITY: SOCIAL NETWORK
DO YOU BELONG TO ANY CLUBS OR ORGANIZATIONS SUCH AS CHURCH GROUPS UNIONS, FRATERNAL OR ATHLETIC GROUPS, OR SCHOOL GROUPS?: NO

## 2024-07-31 SDOH — HEALTH STABILITY: MENTAL HEALTH: HOW MANY STANDARD DRINKS CONTAINING ALCOHOL DO YOU HAVE ON A TYPICAL DAY?: PATIENT DOES NOT DRINK

## 2024-07-31 SDOH — ECONOMIC STABILITY: INCOME INSECURITY: IN THE PAST 12 MONTHS, HAS THE ELECTRIC, GAS, OIL, OR WATER COMPANY THREATENED TO SHUT OFF SERVICE IN YOUR HOME?: NO

## 2024-07-31 SDOH — ECONOMIC STABILITY: FOOD INSECURITY: WITHIN THE PAST 12 MONTHS, YOU WORRIED THAT YOUR FOOD WOULD RUN OUT BEFORE YOU GOT MONEY TO BUY MORE.: SOMETIMES TRUE

## 2024-07-31 SDOH — SOCIAL STABILITY: SOCIAL INSECURITY: WITHIN THE LAST YEAR, HAVE YOU BEEN AFRAID OF YOUR PARTNER OR EX-PARTNER?: NO

## 2024-07-31 SDOH — HEALTH STABILITY: MENTAL HEALTH: HOW OFTEN DO YOU HAVE A DRINK CONTAINING ALCOHOL?: NEVER

## 2024-07-31 SDOH — SOCIAL STABILITY: SOCIAL INSECURITY
WITHIN THE LAST YEAR, HAVE TO BEEN RAPED OR FORCED TO HAVE ANY KIND OF SEXUAL ACTIVITY BY YOUR PARTNER OR EX-PARTNER?: NO

## 2024-07-31 SDOH — SOCIAL STABILITY: SOCIAL NETWORK: HOW OFTEN DO YOU ATTENT MEETINGS OF THE CLUB OR ORGANIZATION YOU BELONG TO?: NEVER

## 2024-07-31 SDOH — SOCIAL STABILITY: SOCIAL NETWORK: ARE YOU MARRIED, WIDOWED, DIVORCED, SEPARATED, NEVER MARRIED, OR LIVING WITH A PARTNER?: LIVING WITH PARTNER

## 2024-07-31 SDOH — SOCIAL STABILITY: SOCIAL NETWORK: IN A TYPICAL WEEK, HOW MANY TIMES DO YOU TALK ON THE PHONE WITH FAMILY, FRIENDS, OR NEIGHBORS?: THREE TIMES A WEEK

## 2024-07-31 SDOH — SOCIAL STABILITY: SOCIAL INSECURITY: WITHIN THE LAST YEAR, HAVE YOU BEEN HUMILIATED OR EMOTIONALLY ABUSED IN OTHER WAYS BY YOUR PARTNER OR EX-PARTNER?: NO

## 2024-07-31 SDOH — HEALTH STABILITY: PHYSICAL HEALTH: ON AVERAGE, HOW MANY MINUTES DO YOU ENGAGE IN EXERCISE AT THIS LEVEL?: 0 MIN

## 2024-07-31 SDOH — ECONOMIC STABILITY: FOOD INSECURITY: WITHIN THE PAST 12 MONTHS, THE FOOD YOU BOUGHT JUST DIDN'T LAST AND YOU DIDN'T HAVE MONEY TO GET MORE.: SOMETIMES TRUE

## 2024-07-31 SDOH — SOCIAL STABILITY: SOCIAL NETWORK: HOW OFTEN DO YOU ATTEND CHURCH OR RELIGIOUS SERVICES?: NEVER

## 2024-07-31 SDOH — SOCIAL STABILITY: SOCIAL NETWORK: HOW OFTEN DO YOU GET TOGETHER WITH FRIENDS OR RELATIVES?: THREE TIMES A WEEK

## 2024-07-31 SDOH — HEALTH STABILITY: PHYSICAL HEALTH: ON AVERAGE, HOW MANY DAYS PER WEEK DO YOU ENGAGE IN MODERATE TO STRENUOUS EXERCISE (LIKE A BRISK WALK)?: 0 DAYS

## 2024-07-31 ASSESSMENT — COGNITIVE AND FUNCTIONAL STATUS - GENERAL
MOBILITY SCORE: 24
DAILY ACTIVITIY SCORE: 24
MOBILITY SCORE: 24
DAILY ACTIVITIY SCORE: 24

## 2024-07-31 ASSESSMENT — LIFESTYLE VARIABLES
SKIP TO QUESTIONS 9-10: 1
AUDIT-C TOTAL SCORE: 0

## 2024-07-31 ASSESSMENT — PAIN SCALES - GENERAL
PAINLEVEL_OUTOF10: 4
PAINLEVEL_OUTOF10: 6

## 2024-07-31 ASSESSMENT — ACTIVITIES OF DAILY LIVING (ADL): LACK_OF_TRANSPORTATION: NO

## 2024-07-31 ASSESSMENT — PAIN - FUNCTIONAL ASSESSMENT
PAIN_FUNCTIONAL_ASSESSMENT: 0-10
PAIN_FUNCTIONAL_ASSESSMENT: 0-10

## 2024-07-31 ASSESSMENT — PAIN DESCRIPTION - DESCRIPTORS: DESCRIPTORS: ACHING;DULL

## 2024-07-31 NOTE — PROGRESS NOTES
Ashley Kessler is a 59 y.o. female on day 1 of admission presenting with Chest pain.      Subjective     Patient denies any actual chest pain, instead saying that is located in the elbow and worse with movement.    States that she felt more short of breath yesterday when she walked all, but when she thinks about, she does not recall last time that she walked 1/4 mile with the dog and has been noticing more shortness of breath when trying to work out.             Objective     Last Recorded Vitals  BP (!) 184/100 (BP Location: Right arm, Patient Position: Lying)   Pulse 78   Temp 36.9 °C (98.4 °F) (Temporal)   Resp 17   Wt 88.2 kg (194 lb 7.1 oz)   SpO2 97%   Intake/Output last 3 Shifts:    Intake/Output Summary (Last 24 hours) at 7/31/2024 1010  Last data filed at 7/31/2024 0400  Gross per 24 hour   Intake 240 ml   Output --   Net 240 ml       Admission Weight  Weight: 83 kg (183 lb) (07/30/24 1541)    Daily Weight  07/31/24 : 88.2 kg (194 lb 7.1 oz)    Image Results  ECG 12 lead  Normal sinus rhythm  Left bundle branch block  Abnormal ECG  When compared with ECG of 05-AUG-2023 18:27,  No significant change was found      Physical Exam  Constitutional:       Appearance: Normal appearance.   HENT:      Right Ear: External ear normal.      Left Ear: External ear normal.   Eyes:      Conjunctiva/sclera: Conjunctivae normal.   Cardiovascular:      Rate and Rhythm: Normal rate.   Pulmonary:      Comments: Some prolonged slightly, and more wheezy on the right though this is also mild  Abdominal:      Palpations: Abdomen is soft.   Musculoskeletal:      Cervical back: Neck supple.      Comments: Limited flexion of the elbow secondary to pain.   Neurological:      General: No focal deficit present.   Psychiatric:         Behavior: Behavior normal.      Comments: Somewhat odd affect         Relevant Results               Assessment/Plan                  Principal Problem:    Chest pain  Active Problems:    Chronic  anxiety    Dyslipidemia    History of lacunar cerebrovascular accident    Hypertension, essential    LBBB (left bundle branch block)    Depression    Insomnia    Type 2 diabetes mellitus without complication, without long-term current use of insulin (Multi)    Elbow pain  -She denies any actual true chest pain, rather having localized elbow pain (no effusion or edema noted)  -Cancel MRI; no suspicion for CVA  -Will get plain film imaging of the elbow and shoulder on the left.  Orthopedic consult  -With the left bundle branch block apparently worse than prior as described by ER and the prior consult cardiology, will keep this.    Shortness of breath  -Higher suspicion is been an ongoing issue  -Suspect there is underlying COPD and will start Breo Ellipta and as needed DuoNeb  -Pulmonology consult, especially given the groundglass findings on CT    Anxiety  -Continue home duloxetine.    Tobacco use  -Nicotine patches available if needed.                  Moses Farley, DO

## 2024-07-31 NOTE — CARE PLAN
The clinical goals for the shift include Remain free of chest pain    Maintained stability overnight, assessment unchanged. Medicated for L arm pain, positive results. Denies chest pain or shortness of breath. Ambulated in room independently. Resting with eyes closed at this time, call light in reach.       Problem: Pain  Goal: Takes deep breaths with improved pain control throughout the shift  Outcome: Progressing  Goal: Turns in bed with improved pain control throughout the shift  Outcome: Progressing  Goal: Walks with improved pain control throughout the shift  Outcome: Progressing

## 2024-07-31 NOTE — H&P
History Of Present Illness        Ashley Kessler is a 59 y.o. female presenting with Left arm Pain and associated Chest Pain.      Patient's ED diagnostic workup was noted for CBC with differential that was essentially unremarkable.  The patient's blood chemistry was noted for elevated glucose 197.  Magnesium level was normal at 1.9.  The creatinine was normal at 0.5.  The D-dimer was elevated 0.52. Her first and second set troponin levels were flat at 10 and 9, respectively.  Chest x-ray was read as no evidence for acute cardiopulmonary process.      Subsequently due to the elevated D-dimer I believe she underwent a CT angiogram of the chest in the ED.    This was noted for the following:      IMPRESSION:      1. No evidence of acute pulmonary embolism in this exam limited by  contrast bolus timing, streak and motion artifact.  2. Patchy ground-glass opacities in a mosaic attenuation pattern with  an upper lobe predominance. Differential includes atypical infection,  hypersensitivity pneumonitis, small-vessel or small airways disease.  3. Asymmetric prominence of the left main pulmonary artery, which may  be due to pulmonic stenosis or pulmonary hypertension.  4. Cardiomegaly.  5. Curvilinear area of probable scarring in the subpleural region of  the posterolateral right upper lobe likely from the prior chest tube.  6. Mild patchy bibasilar subsegmental atelectasis.        Vital signs in the ED noted for Tmax 36.5, pulse rate 90, respiratory rate 18, /98.  Patient saturate 94% on room air.        EKG shows normal sinus rhythm with a rate of 91 beats minute, left bundle branch block, left axis deviation, QTc 482, no evidence of STEMI.           Past Medical History      Chronic right shoulder pain  Hypertension  Expressive aphasia  Diabetes mellitus type 2  Diarrhea  Depression        Surgical History        Past Surgical History:   Procedure Laterality Date    MR HEAD ANGIO WO IV CONTRAST  1/3/2023    MR HEAD  ANGIO WO IV CONTRAST 1/3/2023 [facility]    MR NECK ANGIO WO IV CONTRAST  1/3/2023    MR NECK ANGIO WO IV CONTRAST 1/3/2023 [facility]            Social History      She reports that she has quit smoking. Her smoking use included cigarettes. She has never used smokeless tobacco. She reports that she does not currently use alcohol. She reports that she does not currently use drugs.        Family History        Family History   Family history unknown: Yes            Allergies        Patient has no known allergies.          Review of Systems      14-point ROS otherwise negative, as per HPI/Interval History.    General: No change in weight. No weakenss. No Fevers/Chills/Night Sweats   Skin: No skin/hair/nail changes. No rashes or sores.  Head:  No trauma. No Headache/nasuea/vomitting.   Eyes: No visual changes. No tearing. No itching.   Ears: No hearing loss. No tinnitus. No vertigo. No discharge.  Nose, Sinuses: No rhinorrhea, No nasal congestion. No epistaxis.  Mouth, Throat, Neck: No bleeding gums, hoarseness, sore throat or swollen neck  Cardiac: No palpitations. No ALMONTE. No PND. No Orthopnea.   Respiratory: No Shortness of Breath. No wheezing. No cough. No hemoptysis.   GI: No nausea/vomiting. No indigestion. No diarrhea. No constipation.   Extremities: No numbness or tingling. No paresthesias.  Left arm pain  Urinary: No change in urinary frequency. No change in hesitancy. No hematuria. No incontinence.           Physical Exam        Constitutional:  Pleasant  Eyes: PERRL, EOMI,   ENMT: mucous membranes moist  Head/Neck: Neck supple, No JVD,   Respiratory/Thorax: Patent airways, CTAB,   Cardiovascular: Regular, rate and rhythm, no murmurs  Gastrointestinal: Soft, non-distended, +BS.  Musculoskeletal: ROM intact, no joint swelling, normal strength  Extremities: peripheral pulses intact; no edema.  Left Upper extremity paresis  Neurological: Alert and Oriented x 3; no focal deficits; gross motor  "and sensation intact; CN II-XII intact. No asterixis.  Psychological: Appropriate mood and behavior  Skin: No lesions, No rashes.           Last Recorded Vitals  Blood pressure 112/62, pulse 78, temperature 36.5 °C (97.7 °F), resp. rate (!) 23, height 1.473 m (4' 10\"), weight 83 kg (183 lb), SpO2 96%.    Relevant Results    Lab Results   Component Value Date    WBC 10.1 07/30/2024    HGB 13.6 07/30/2024    HCT 41.4 07/30/2024    MCV 92 07/30/2024     07/30/2024       Lab Results   Component Value Date    GLUCOSE 197 (H) 07/30/2024    CALCIUM 9.3 07/30/2024     07/30/2024    K 3.9 07/30/2024    CO2 26 07/30/2024     07/30/2024    BUN 11 07/30/2024    CREATININE 0.50 07/30/2024       Lab Results   Component Value Date    HGBA1C 7.1 (A) 06/11/2024         No CT head results found for the past 12 months      Scheduled medications  insulin lispro, 0-5 Units, subcutaneous, Before meals & nightly      Continuous medications     PRN medications  PRN medications: dextrose, dextrose, glucagon, glucagon        Assessment/Plan   Principal Problem:    Chest pain  Active Problems:    History of lacunar cerebrovascular accident    Hypertension, essential    LBBB (left bundle branch block)    Type 2 diabetes mellitus without complication, without long-term current use of insulin (Multi)          Ashley Kessler is a 59 y.o. female presenting with Chest Pain.  Patient admitted for further evaluation and management.        Chest Pain (Non-anginal versus Atypical)    Admit patient to Telemetry service  Continuous Cardiac Monitor and BP Monitor Placement   Cardiology evaluation in AM.   Cardiac enzymes are negative (x 1 set) for acute MI.   Follow up second set CE (Tropinin + CK)   Monitor Electrolytes, Keep K+>4 + Mg++>2.   EKG reviewed  Will keep patient NPO past midnight   Continue home statin therapy  Follow-up hemoglobin A1c level lipid profile  Defer 2D-Echocardiography to evaluate for LVEF, Regional Wall motion " abnormalities, and any Valvular defects to Cardiology      Left-Arm Weakness    Will order MRI of the brain evaluate for recurrent CVA  Will need to consider neurology consultation based off of results        LBBB    Appears to be chronic in nature      Incidental patchy ground-glass opacities in a mosaic attenuation pattern with  an upper lobe predominance      Differential includes atypical infection, hypersensitivity pneumonitis, small-vessel or small airways disease.  Will consult pulmonary team to further evaluate      Mild patchy bibasilar subsegmental atelectasis    Will order incentive spirometer      T2DM    POCT every 6 hourly  Insulin sliding scale  Follow-up hemoglobin A1c  Patient follows with PCP previously was on Trulicity and Victoza were discontinued secondary to fecal incontinence  She had weight gain soft Mounjaro  PCP is trying tirzepatide      Morbid Obesity    Lifestyle modification counseling      Tobacco Dependence     Cessation encouraged.  Physiologic and physical aspects of tobacco addiction as well as strategies for quitting were discussed.   Counseling was given focusing on the harmful effects of this addiction especially given the patient's medical condition(s) which will be worsened because of the chemicals in tobacco.  NRT Ordered      H/o Expressive Aphasia     Chronic, previously worse with anxiety   Following with psychiatry  Refer back to neuro and speech therapy       Bipolar disorder    Patient is requesting her Abilify home dose      Peripheral neuropathy    Patient is requesting gabapentinoid therapy      Hypertension    Continue to monitor BP and adjust hypertensive medications according      GI and DVT Prophylaxis    Home oral PPI  Lovenox subcu          The patient has been Instructed by me upon admission to follow-up with their PCP upon discharge to obtain repeat blood work/CXR and to maintain close medical follow-up for their current disease process.      This Dictation  was Transcribed using a Nuance Dragon Voice Recognition System Device (with Compatible Computer + Software) and as such may contain Grammatical Errors and Unintentional Typing Misprints.      I spent 32 minutes in the professional and overall care of this patient.      Jeancarlos Valdez MD

## 2024-07-31 NOTE — CONSULTS
"Pulmonary Consult Note    Chief Complaint   Patient presents with    Chest Pain        Reason for consultation:  Abnormal chest CT, dyspnea    History Of Present Illness  Ashley Kessler is a 59 y.o. female presenting with left arm pain that is been present over the last couple days.  She denies any antecedent trauma.  She states this radiates to his shoulder and is worsened when she bends her elbow.  She also endorsed some shortness of breath.  She had elevated D-dimer and CT was checked which shows diffuse groundglass changes.  She denies any significant coughing.  She denies any fevers or chills or recent sick contacts.  She is a smoker of 1 pack/day.  A couple days ago she did utilized a vaping product which she has not used in a while.  We are asked to comment on her shortness of breath as well as abnormal chest CT.     Past Medical History  She has no past medical history on file.    Surgical History  She has a past surgical history that includes MR angio head wo IV contrast (1/3/2023) and MR angio neck wo IV contrast (1/3/2023).     Social History  She reports that she has quit smoking. Her smoking use included cigarettes. She has never used smokeless tobacco. She reports that she does not currently use alcohol. She reports that she does not currently use drugs.    Family History  Family History   Family history unknown: Yes        Allergies  Patient has no known allergies.    Review of Systems   All other systems reviewed and are negative.      Last Recorded Vitals  Blood pressure (!) 184/100, pulse 78, temperature 36.9 °C (98.4 °F), temperature source Temporal, resp. rate 17, height 1.473 m (4' 9.99\"), weight 88.2 kg (194 lb 7.1 oz), SpO2 97%.     Physical Exam  Vitals reviewed.   Constitutional:       General: She is not in acute distress.     Appearance: She is obese. She is not ill-appearing.   HENT:      Head: Normocephalic and atraumatic.      Nose: Nose normal.      Mouth/Throat:      Mouth: Mucous " membranes are moist.      Pharynx: Oropharynx is clear.   Eyes:      General: No scleral icterus.     Conjunctiva/sclera: Conjunctivae normal.   Cardiovascular:      Rate and Rhythm: Normal rate.   Pulmonary:      Effort: Pulmonary effort is normal.      Breath sounds: Wheezing present.      Comments: Mainly on inspiration  Abdominal:      Palpations: Abdomen is soft.   Musculoskeletal:      Right lower leg: No edema.      Left lower leg: No edema.   Skin:     General: Skin is warm and dry.   Neurological:      General: No focal deficit present.      Mental Status: She is alert.   Psychiatric:         Mood and Affect: Mood normal.         Behavior: Behavior normal.         Meds  Scheduled medications  ARIPiprazole, 5 mg, oral, Daily  aspirin, 81 mg, oral, Daily  atorvastatin, 80 mg, oral, Nightly  DULoxetine, 30 mg, oral, Daily  enoxaparin, 40 mg, subcutaneous, Daily  escitalopram, 10 mg, oral, Daily  fluticasone, 2 spray, Each Nostril, Daily  fluticasone furoate-vilanteroL, 1 puff, inhalation, Daily  insulin lispro, 0-5 Units, subcutaneous, Before meals & nightly  losartan, 75 mg, oral, Daily  pantoprazole, 20 mg, oral, Daily      Continuous medications     PRN medications  PRN medications: acetaminophen **OR** acetaminophen **OR** acetaminophen, benzocaine-menthol, dextromethorphan-guaifenesin, dextrose, dextrose, gabapentin, glucagon, glucagon, guaiFENesin, hydrALAZINE, HYDROcodone-acetaminophen, ipratropium-albuteroL, ondansetron **OR** ondansetron, polyethylene glycol       Relevant Results  CBC reviewed without any leukocytosis  CHEM comprehensive panel reviewed and grossly unremarkable  CT angio personally reviewed and shows no PE I agree.  Diffuse groundglass opacities with mosaic attenuation that I agree could be in the setting of airway disease versus hypersensitivity pneumonitis      Principal Problem:    Chest pain  Active Problems:    Chronic anxiety    Dyslipidemia    History of lacunar cerebrovascular  accident    Hypertension, essential    LBBB (left bundle branch block)    Depression    Insomnia    Type 2 diabetes mellitus without complication, without long-term current use of insulin (Multi)       Recommendations  Dyspnea: I do suspect in the setting of airway disease  Will get pulmonary function testing today but very least spirometry/DLCO  Bilateral groundglass changes: Inflammatory in the setting of recent vaping use.  Doubt infectious as does not have any stigmata of infection.  Pulmonary edema to be excluded  Check procalcitonin  Sputum culture if able  We will hold on any systemic antibiotics  Agree with inhaled corticosteroids  Cardiology has been consulted by primary team which is not unreasonable  Smoking/vaping  This could be smoking-related and discussed this with patient.  Smoking cessation is essential to preventing decline in lung function    Thank you for this consultation.  We will follow with you     Kenia Boswell MD  Pulmonary/Critical Care Physician
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
## 2024-07-31 NOTE — PROGRESS NOTES
07/31/24 1505   Discharge Planning   Living Arrangements Spouse/significant other   Support Systems Spouse/significant other;Family members   Assistance Needed None   Type of Residence Private residence   Number of Stairs to Enter Residence 3   Number of Stairs Within Residence 0   Do you have animals or pets at home? No   Who is requesting discharge planning? Provider   Home or Post Acute Services None   Expected Discharge Disposition Home   Does the patient need discharge transport arranged? No   Financial Resource Strain   How hard is it for you to pay for the very basics like food, housing, medical care, and heating? Somewhat   Housing Stability   In the last 12 months, was there a time when you were not able to pay the mortgage or rent on time? Y   In the past 12 months, how many times have you moved where you were living? 2   At any time in the past 12 months, were you homeless or living in a shelter (including now)? N   Transportation Needs   In the past 12 months, has lack of transportation kept you from medical appointments or from getting medications? no   In the past 12 months, has lack of transportation kept you from meetings, work, or from getting things needed for daily living? No     Plan is to return home with no skilled services

## 2024-08-01 VITALS
TEMPERATURE: 97.3 F | BODY MASS INDEX: 41.37 KG/M2 | HEART RATE: 79 BPM | DIASTOLIC BLOOD PRESSURE: 89 MMHG | SYSTOLIC BLOOD PRESSURE: 144 MMHG | WEIGHT: 197.09 LBS | OXYGEN SATURATION: 96 % | RESPIRATION RATE: 19 BRPM | HEIGHT: 58 IN

## 2024-08-01 LAB
GLUCOSE BLD MANUAL STRIP-MCNC: 167 MG/DL (ref 74–99)
PROCALCITONIN SERPL-MCNC: <0.02 NG/ML

## 2024-08-01 PROCEDURE — 84145 PROCALCITONIN (PCT): CPT | Mod: WESLAB | Performed by: HOSPITALIST

## 2024-08-01 PROCEDURE — 2500000002 HC RX 250 W HCPCS SELF ADMINISTERED DRUGS (ALT 637 FOR MEDICARE OP, ALT 636 FOR OP/ED): Performed by: INTERNAL MEDICINE

## 2024-08-01 PROCEDURE — 94760 N-INVAS EAR/PLS OXIMETRY 1: CPT

## 2024-08-01 PROCEDURE — G0378 HOSPITAL OBSERVATION PER HR: HCPCS

## 2024-08-01 PROCEDURE — 87070 CULTURE OTHR SPECIMN AEROBIC: CPT | Mod: WESLAB | Performed by: HOSPITALIST

## 2024-08-01 PROCEDURE — 94664 DEMO&/EVAL PT USE INHALER: CPT | Mod: 59

## 2024-08-01 PROCEDURE — 99238 HOSP IP/OBS DSCHRG MGMT 30/<: CPT | Performed by: FAMILY MEDICINE

## 2024-08-01 PROCEDURE — 2500000001 HC RX 250 WO HCPCS SELF ADMINISTERED DRUGS (ALT 637 FOR MEDICARE OP): Performed by: FAMILY MEDICINE

## 2024-08-01 PROCEDURE — 2500000002 HC RX 250 W HCPCS SELF ADMINISTERED DRUGS (ALT 637 FOR MEDICARE OP, ALT 636 FOR OP/ED): Performed by: FAMILY MEDICINE

## 2024-08-01 PROCEDURE — 94640 AIRWAY INHALATION TREATMENT: CPT

## 2024-08-01 PROCEDURE — 9420000001 HC RT PATIENT EDUCATION 5 MIN

## 2024-08-01 PROCEDURE — 2500000001 HC RX 250 WO HCPCS SELF ADMINISTERED DRUGS (ALT 637 FOR MEDICARE OP): Performed by: INTERNAL MEDICINE

## 2024-08-01 PROCEDURE — 36415 COLL VENOUS BLD VENIPUNCTURE: CPT | Performed by: HOSPITALIST

## 2024-08-01 PROCEDURE — 82947 ASSAY GLUCOSE BLOOD QUANT: CPT

## 2024-08-01 RX ORDER — ARIPIPRAZOLE 5 MG/1
5 TABLET ORAL DAILY
Status: DISCONTINUED | OUTPATIENT
Start: 2024-08-01 | End: 2024-08-01 | Stop reason: HOSPADM

## 2024-08-01 RX ORDER — GABAPENTIN 300 MG/1
300 CAPSULE ORAL 3 TIMES DAILY
Status: DISCONTINUED | OUTPATIENT
Start: 2024-08-01 | End: 2024-08-01 | Stop reason: HOSPADM

## 2024-08-01 ASSESSMENT — COGNITIVE AND FUNCTIONAL STATUS - GENERAL
DAILY ACTIVITIY SCORE: 24
MOBILITY SCORE: 24

## 2024-08-01 ASSESSMENT — PAIN SCALES - GENERAL: PAINLEVEL_OUTOF10: 0 - NO PAIN

## 2024-08-01 ASSESSMENT — PAIN - FUNCTIONAL ASSESSMENT: PAIN_FUNCTIONAL_ASSESSMENT: 0-10

## 2024-08-01 NOTE — CONSULTS
"Reason For Consult  Left elbow pain    History Of Present Illness  Ashley Kessler is a 59 y.o. female presenting with cast pain.  Orthopedics was consulted for left elbow pain.  Patient notes 3 days of pain that traveled from her upper arm down to her forearm.  This pain has now completely resolved.  She denies any recent trauma or injury.  Denies any swelling about the arm.  Denies fevers or chills.     Past Medical History  She has no past medical history on file.    Surgical History  She has a past surgical history that includes MR angio head wo IV contrast (1/3/2023) and MR angio neck wo IV contrast (1/3/2023).     Social History  She reports that she has quit smoking. Her smoking use included cigarettes. She has never used smokeless tobacco. She reports that she does not currently use alcohol. She reports that she does not currently use drugs.    Family History  Family History   Family history unknown: Yes        Allergies  Patient has no known allergies.    Review of Systems  At least 10 systems reviewed and are otherwise negative except as described in the HPI     Physical Exam  Left upper extremity: No tenderness about the shoulder elbow or wrist.  Full range of motion of the elbow.  Compartments are soft and compressible.  Neurovascular intact distally.     Last Recorded Vitals  Blood pressure 144/89, pulse 84, temperature 36.3 °C (97.3 °F), temperature source Temporal, resp. rate 20, height 1.473 m (4' 9.99\"), weight 89.4 kg (197 lb 1.5 oz), SpO2 96%.    Relevant Results      Scheduled medications  ARIPiprazole, 5 mg, oral, Daily  aspirin, 81 mg, oral, Daily  atorvastatin, 80 mg, oral, Nightly  DULoxetine, 30 mg, oral, Daily  empagliflozin, 10 mg, oral, Daily  enoxaparin, 40 mg, subcutaneous, Daily  escitalopram, 10 mg, oral, Daily  fluticasone, 2 spray, Each Nostril, Daily  fluticasone furoate-vilanteroL, 1 puff, inhalation, Daily  gabapentin, 300 mg, oral, TID  insulin lispro, 0-5 Units, subcutaneous, " Before meals & nightly  losartan, 75 mg, oral, Daily  pantoprazole, 20 mg, oral, Daily      Continuous medications     PRN medications  PRN medications: acetaminophen **OR** acetaminophen **OR** acetaminophen, benzocaine-menthol, dextromethorphan-guaifenesin, dextrose, dextrose, glucagon, glucagon, guaiFENesin, hydrALAZINE, HYDROcodone-acetaminophen, ipratropium-albuteroL, ondansetron **OR** ondansetron, polyethylene glycol  Results for orders placed or performed during the hospital encounter of 07/30/24 (from the past 24 hour(s))   POCT GLUCOSE   Result Value Ref Range    POCT Glucose 188 (H) 74 - 99 mg/dL   POCT GLUCOSE   Result Value Ref Range    POCT Glucose 159 (H) 74 - 99 mg/dL   POCT GLUCOSE   Result Value Ref Range    POCT Glucose 153 (H) 74 - 99 mg/dL   POCT GLUCOSE   Result Value Ref Range    POCT Glucose 167 (H) 74 - 99 mg/dL        Assessment/Plan     59-year-old female who developed pain in her left arm.  This pain has now resolved.  X-rays were taken and reviewed and shows no acute fracture.  She is now pain-free.  We discussed this may have been some cervical radiculopathy.  If she develops pain in the future she may follow-up with one of our upper extremity surgeons in the office as an outpatient.    Greg Lyon MD

## 2024-08-01 NOTE — CARE PLAN
The clinical goals for the shift include Pain management      Maintained stability overnight, assessment unchanged. Medicated for pain once with positive results. Continues to have weakness in left arm. Ad arcelia in room. Denies any cough or shortness of breath. All needs met at this time, call light in reach.       Problem: Pain  Goal: Takes deep breaths with improved pain control throughout the shift  Outcome: Progressing  Goal: Walks with improved pain control throughout the shift  Outcome: Progressing  Goal: Performs ADL's with improved pain control throughout shift  Outcome: Progressing

## 2024-08-01 NOTE — DISCHARGE SUMMARY
Discharge Diagnosis  Chest pain, ACS ruled out  Left elbow pain     Discharge Meds     Your medication list        CONTINUE taking these medications        Instructions Last Dose Given Next Dose Due   acetaminophen 325 mg tablet  Commonly known as: Tylenol           ARIPiprazole 5 mg tablet  Commonly known as: Abilify           aspirin 81 mg EC tablet           atorvastatin 80 mg tablet  Commonly known as: Lipitor           CALCIUM 600 + D(3) ORAL           cyclobenzaprine 10 mg tablet  Commonly known as: Flexeril      Take 1 tablet (10 mg) by mouth 3 times a day as needed for muscle spasms.       DULoxetine 60 mg DR capsule  Commonly known as: Cymbalta           gabapentin 300 mg capsule  Commonly known as: Neurontin           gabapentin 300 mg capsule  Commonly known as: Neurontin      Take 1 capsule (300 mg) by mouth 3 times a day.       GLUCAGON (HCL) EMERGENCY KIT INJ           Jardiance 10 mg  Generic drug: empagliflozin           losartan 25 mg tablet  Commonly known as: Cozaar           metFORMIN  mg 24 hr tablet  Commonly known as: Glucophage-XR           propranolol LA 60 mg 24 hr capsule  Commonly known as: Inderal LA           Proventil HFA 90 mcg/actuation inhaler  Generic drug: albuterol                    Test Results Pending At Discharge  Pending Labs       Order Current Status    Procalcitonin In process            Hospital Course   Ashley Kessler is a 59 y.o. female presenting with Left arm Pain and associated Chest Pain.    Patient's ED diagnostic workup was noted for CBC with differential that was essentially unremarkable.  The patient's blood chemistry was noted for elevated glucose 197.  Magnesium level was normal at 1.9.  The creatinine was normal at 0.5.  The D-dimer was elevated 0.52. Her first and second set troponin levels were flat at 10 and 9, respectively.  Chest x-ray was read as no evidence for acute cardiopulmonary process.  The patient was evaluated by cardiology and was cleared  for discharge.  The patient has no more chest pain.  She was also evaluated    4 orthopedic surgery for left elbow pain who recommended outpatient follow-up.  The patient was discharged home in stable condition    Pertinent Physical Exam At Time of Discharge  Physical Exam  Alert and oriented x 3, morbidly obese  Lungs clear to auscultation bilaterally  Heart regular rhythm  Abdomen soft nontender  Extremities left elbow tender to palpation  CNS nonfocal exam    Outpatient Follow-Up  Follow-up with orthopedic surgery    Clara Paula MD

## 2024-08-03 LAB
BACTERIA SPEC RESP CULT: NORMAL
GRAM STN SPEC: NORMAL
GRAM STN SPEC: NORMAL

## 2024-09-23 ENCOUNTER — APPOINTMENT (OUTPATIENT)
Dept: RADIOLOGY | Facility: HOSPITAL | Age: 59
End: 2024-09-23
Payer: MEDICAID

## 2024-09-23 ENCOUNTER — HOSPITAL ENCOUNTER (OUTPATIENT)
Dept: RADIOLOGY | Facility: CLINIC | Age: 59
Discharge: HOME | End: 2024-09-23
Payer: MEDICAID

## 2024-09-23 DIAGNOSIS — R91.8 OTHER NONSPECIFIC ABNORMAL FINDING OF LUNG FIELD: ICD-10-CM

## 2024-09-23 PROCEDURE — 71250 CT THORAX DX C-: CPT | Performed by: RADIOLOGY

## 2024-09-23 PROCEDURE — 71250 CT THORAX DX C-: CPT

## 2024-10-16 ENCOUNTER — HOSPITAL ENCOUNTER (OUTPATIENT)
Dept: RADIOLOGY | Facility: CLINIC | Age: 59
Discharge: HOME | End: 2024-10-16
Payer: MEDICAID

## 2024-10-16 VITALS — WEIGHT: 197.09 LBS | BODY MASS INDEX: 41.37 KG/M2 | HEIGHT: 58 IN

## 2024-10-16 DIAGNOSIS — N63.10 UNSPECIFIED LUMP IN THE RIGHT BREAST, UNSPECIFIED QUADRANT: ICD-10-CM

## 2024-10-16 PROCEDURE — 77066 DX MAMMO INCL CAD BI: CPT

## 2024-10-16 PROCEDURE — 77066 DX MAMMO INCL CAD BI: CPT | Performed by: RADIOLOGY

## 2024-10-16 PROCEDURE — 77062 BREAST TOMOSYNTHESIS BI: CPT | Performed by: RADIOLOGY

## 2024-10-16 PROCEDURE — 76642 ULTRASOUND BREAST LIMITED: CPT | Performed by: RADIOLOGY

## 2024-10-16 PROCEDURE — 76982 USE 1ST TARGET LESION: CPT | Mod: RT

## 2024-10-16 PROCEDURE — 76642 ULTRASOUND BREAST LIMITED: CPT | Mod: RT

## 2024-11-26 ENCOUNTER — APPOINTMENT (OUTPATIENT)
Dept: RADIOLOGY | Facility: HOSPITAL | Age: 59
End: 2024-11-26
Payer: MEDICAID

## 2024-11-26 ENCOUNTER — HOSPITAL ENCOUNTER (INPATIENT)
Facility: HOSPITAL | Age: 59
LOS: 3 days | Discharge: HOME | End: 2024-11-29
Attending: EMERGENCY MEDICINE | Admitting: INTERNAL MEDICINE
Payer: MEDICAID

## 2024-11-26 ENCOUNTER — APPOINTMENT (OUTPATIENT)
Dept: CARDIOLOGY | Facility: HOSPITAL | Age: 59
End: 2024-11-26
Payer: MEDICAID

## 2024-11-26 DIAGNOSIS — J96.01 ACUTE HYPOXIC RESPIRATORY FAILURE (MULTI): Primary | ICD-10-CM

## 2024-11-26 DIAGNOSIS — U07.1 COVID-19: ICD-10-CM

## 2024-11-26 DIAGNOSIS — I42.9 CARDIOMYOPATHY, UNSPECIFIED TYPE (MULTI): ICD-10-CM

## 2024-11-26 DIAGNOSIS — J45.901 MODERATE ASTHMA WITH ACUTE EXACERBATION, UNSPECIFIED WHETHER PERSISTENT (HHS-HCC): ICD-10-CM

## 2024-11-26 DIAGNOSIS — J45.41 MODERATE PERSISTENT ASTHMA WITH ACUTE EXACERBATION (HHS-HCC): ICD-10-CM

## 2024-11-26 LAB
ALBUMIN SERPL BCP-MCNC: 4.8 G/DL (ref 3.4–5)
ALP SERPL-CCNC: 72 U/L (ref 33–110)
ALT SERPL W P-5'-P-CCNC: 18 U/L (ref 7–45)
ANION GAP SERPL CALCULATED.3IONS-SCNC: 13 MMOL/L (ref 10–20)
AST SERPL W P-5'-P-CCNC: 26 U/L (ref 9–39)
BASOPHILS # BLD AUTO: 0.03 X10*3/UL (ref 0–0.1)
BASOPHILS # BLD AUTO: 0.03 X10*3/UL (ref 0–0.1)
BASOPHILS NFR BLD AUTO: 0.2 %
BASOPHILS NFR BLD AUTO: 0.2 %
BILIRUB SERPL-MCNC: 0.6 MG/DL (ref 0–1.2)
BUN SERPL-MCNC: 13 MG/DL (ref 6–23)
CALCIUM SERPL-MCNC: 10 MG/DL (ref 8.6–10.3)
CARDIAC TROPONIN I PNL SERPL HS: 6 NG/L (ref 0–13)
CARDIAC TROPONIN I PNL SERPL HS: 6 NG/L (ref 0–13)
CHLORIDE SERPL-SCNC: 101 MMOL/L (ref 98–107)
CO2 SERPL-SCNC: 29 MMOL/L (ref 21–32)
CREAT SERPL-MCNC: 0.64 MG/DL (ref 0.5–1.05)
EGFRCR SERPLBLD CKD-EPI 2021: >90 ML/MIN/1.73M*2
EOSINOPHIL # BLD AUTO: 0.01 X10*3/UL (ref 0–0.7)
EOSINOPHIL # BLD AUTO: 0.09 X10*3/UL (ref 0–0.7)
EOSINOPHIL NFR BLD AUTO: 0.1 %
EOSINOPHIL NFR BLD AUTO: 0.6 %
ERYTHROCYTE [DISTWIDTH] IN BLOOD BY AUTOMATED COUNT: 12.9 % (ref 11.5–14.5)
ERYTHROCYTE [DISTWIDTH] IN BLOOD BY AUTOMATED COUNT: 13.2 % (ref 11.5–14.5)
FLUAV RNA RESP QL NAA+PROBE: NOT DETECTED
FLUBV RNA RESP QL NAA+PROBE: NOT DETECTED
GLUCOSE SERPL-MCNC: 182 MG/DL (ref 74–99)
HCT VFR BLD AUTO: 50.8 % (ref 36–46)
HCT VFR BLD AUTO: 53.5 % (ref 36–46)
HGB BLD-MCNC: 16.3 G/DL (ref 12–16)
HGB BLD-MCNC: 17.4 G/DL (ref 12–16)
IMM GRANULOCYTES # BLD AUTO: 0.05 X10*3/UL (ref 0–0.7)
IMM GRANULOCYTES # BLD AUTO: 0.06 X10*3/UL (ref 0–0.7)
IMM GRANULOCYTES NFR BLD AUTO: 0.3 % (ref 0–0.9)
IMM GRANULOCYTES NFR BLD AUTO: 0.5 % (ref 0–0.9)
LYMPHOCYTES # BLD AUTO: 0.93 X10*3/UL (ref 1.2–4.8)
LYMPHOCYTES # BLD AUTO: 2.36 X10*3/UL (ref 1.2–4.8)
LYMPHOCYTES NFR BLD AUTO: 15.9 %
LYMPHOCYTES NFR BLD AUTO: 7.1 %
MAGNESIUM SERPL-MCNC: 2.16 MG/DL (ref 1.6–2.4)
MCH RBC QN AUTO: 29.5 PG (ref 26–34)
MCH RBC QN AUTO: 30 PG (ref 26–34)
MCHC RBC AUTO-ENTMCNC: 32.1 G/DL (ref 32–36)
MCHC RBC AUTO-ENTMCNC: 32.5 G/DL (ref 32–36)
MCV RBC AUTO: 92 FL (ref 80–100)
MCV RBC AUTO: 92 FL (ref 80–100)
MONOCYTES # BLD AUTO: 0.19 X10*3/UL (ref 0.1–1)
MONOCYTES # BLD AUTO: 0.85 X10*3/UL (ref 0.1–1)
MONOCYTES NFR BLD AUTO: 1.5 %
MONOCYTES NFR BLD AUTO: 5.7 %
NEUTROPHILS # BLD AUTO: 11.48 X10*3/UL (ref 1.2–7.7)
NEUTROPHILS # BLD AUTO: 11.84 X10*3/UL (ref 1.2–7.7)
NEUTROPHILS NFR BLD AUTO: 77.3 %
NEUTROPHILS NFR BLD AUTO: 90.6 %
NRBC BLD-RTO: 0 /100 WBCS (ref 0–0)
NRBC BLD-RTO: 0 /100 WBCS (ref 0–0)
PLATELET # BLD AUTO: 302 X10*3/UL (ref 150–450)
PLATELET # BLD AUTO: 368 X10*3/UL (ref 150–450)
POTASSIUM SERPL-SCNC: 4 MMOL/L (ref 3.5–5.3)
PROT SERPL-MCNC: 7.7 G/DL (ref 6.4–8.2)
RBC # BLD AUTO: 5.52 X10*6/UL (ref 4–5.2)
RBC # BLD AUTO: 5.8 X10*6/UL (ref 4–5.2)
SARS-COV-2 RNA RESP QL NAA+PROBE: DETECTED
SODIUM SERPL-SCNC: 139 MMOL/L (ref 136–145)
WBC # BLD AUTO: 13.1 X10*3/UL (ref 4.4–11.3)
WBC # BLD AUTO: 14.9 X10*3/UL (ref 4.4–11.3)

## 2024-11-26 PROCEDURE — 71275 CT ANGIOGRAPHY CHEST: CPT

## 2024-11-26 PROCEDURE — 96375 TX/PRO/DX INJ NEW DRUG ADDON: CPT

## 2024-11-26 PROCEDURE — 80053 COMPREHEN METABOLIC PANEL: CPT | Performed by: PHYSICIAN ASSISTANT

## 2024-11-26 PROCEDURE — 2550000001 HC RX 255 CONTRASTS: Performed by: EMERGENCY MEDICINE

## 2024-11-26 PROCEDURE — 71275 CT ANGIOGRAPHY CHEST: CPT | Performed by: STUDENT IN AN ORGANIZED HEALTH CARE EDUCATION/TRAINING PROGRAM

## 2024-11-26 PROCEDURE — 2500000001 HC RX 250 WO HCPCS SELF ADMINISTERED DRUGS (ALT 637 FOR MEDICARE OP): Performed by: INTERNAL MEDICINE

## 2024-11-26 PROCEDURE — 99291 CRITICAL CARE FIRST HOUR: CPT | Performed by: INTERNAL MEDICINE

## 2024-11-26 PROCEDURE — 99285 EMERGENCY DEPT VISIT HI MDM: CPT | Mod: 25

## 2024-11-26 PROCEDURE — 83735 ASSAY OF MAGNESIUM: CPT | Performed by: PHYSICIAN ASSISTANT

## 2024-11-26 PROCEDURE — 3E0333Z INTRODUCTION OF ANTI-INFLAMMATORY INTO PERIPHERAL VEIN, PERCUTANEOUS APPROACH: ICD-10-PCS | Performed by: PHYSICIAN ASSISTANT

## 2024-11-26 PROCEDURE — 71045 X-RAY EXAM CHEST 1 VIEW: CPT

## 2024-11-26 PROCEDURE — 83036 HEMOGLOBIN GLYCOSYLATED A1C: CPT | Mod: WESLAB | Performed by: INTERNAL MEDICINE

## 2024-11-26 PROCEDURE — 84484 ASSAY OF TROPONIN QUANT: CPT | Performed by: PHYSICIAN ASSISTANT

## 2024-11-26 PROCEDURE — 1200000002 HC GENERAL ROOM WITH TELEMETRY DAILY

## 2024-11-26 PROCEDURE — 85025 COMPLETE CBC W/AUTO DIFF WBC: CPT | Performed by: INTERNAL MEDICINE

## 2024-11-26 PROCEDURE — 36415 COLL VENOUS BLD VENIPUNCTURE: CPT | Performed by: PHYSICIAN ASSISTANT

## 2024-11-26 PROCEDURE — 93005 ELECTROCARDIOGRAM TRACING: CPT

## 2024-11-26 PROCEDURE — 96374 THER/PROPH/DIAG INJ IV PUSH: CPT

## 2024-11-26 PROCEDURE — 94640 AIRWAY INHALATION TREATMENT: CPT

## 2024-11-26 PROCEDURE — 71045 X-RAY EXAM CHEST 1 VIEW: CPT | Performed by: RADIOLOGY

## 2024-11-26 PROCEDURE — 2500000004 HC RX 250 GENERAL PHARMACY W/ HCPCS (ALT 636 FOR OP/ED): Performed by: INTERNAL MEDICINE

## 2024-11-26 PROCEDURE — 2500000002 HC RX 250 W HCPCS SELF ADMINISTERED DRUGS (ALT 637 FOR MEDICARE OP, ALT 636 FOR OP/ED)

## 2024-11-26 PROCEDURE — 85025 COMPLETE CBC W/AUTO DIFF WBC: CPT | Performed by: PHYSICIAN ASSISTANT

## 2024-11-26 PROCEDURE — 2500000004 HC RX 250 GENERAL PHARMACY W/ HCPCS (ALT 636 FOR OP/ED)

## 2024-11-26 PROCEDURE — 93010 ELECTROCARDIOGRAM REPORT: CPT | Performed by: INTERNAL MEDICINE

## 2024-11-26 PROCEDURE — 87636 SARSCOV2 & INF A&B AMP PRB: CPT | Performed by: PHYSICIAN ASSISTANT

## 2024-11-26 PROCEDURE — 2500000002 HC RX 250 W HCPCS SELF ADMINISTERED DRUGS (ALT 637 FOR MEDICARE OP, ALT 636 FOR OP/ED): Performed by: INTERNAL MEDICINE

## 2024-11-26 RX ORDER — DEXAMETHASONE SODIUM PHOSPHATE 10 MG/ML
INJECTION INTRAMUSCULAR; INTRAVENOUS
Status: COMPLETED
Start: 2024-11-26 | End: 2024-11-26

## 2024-11-26 RX ORDER — ACETAMINOPHEN 325 MG/1
650 TABLET ORAL EVERY 6 HOURS PRN
Status: DISCONTINUED | OUTPATIENT
Start: 2024-11-26 | End: 2024-11-29 | Stop reason: HOSPADM

## 2024-11-26 RX ORDER — NICOTINE 7MG/24HR
1 PATCH, TRANSDERMAL 24 HOURS TRANSDERMAL DAILY
Status: DISCONTINUED | OUTPATIENT
Start: 2024-11-26 | End: 2024-11-29 | Stop reason: HOSPADM

## 2024-11-26 RX ORDER — DEXAMETHASONE SODIUM PHOSPHATE 10 MG/ML
10 INJECTION INTRAMUSCULAR; INTRAVENOUS ONCE
Status: COMPLETED | OUTPATIENT
Start: 2024-11-26 | End: 2024-11-26

## 2024-11-26 RX ORDER — CYCLOBENZAPRINE HCL 10 MG
10 TABLET ORAL 3 TIMES DAILY PRN
Status: DISCONTINUED | OUTPATIENT
Start: 2024-11-26 | End: 2024-11-29 | Stop reason: HOSPADM

## 2024-11-26 RX ORDER — FAMOTIDINE 10 MG/ML
INJECTION INTRAVENOUS
Status: COMPLETED
Start: 2024-11-26 | End: 2024-11-26

## 2024-11-26 RX ORDER — IPRATROPIUM BROMIDE AND ALBUTEROL SULFATE 2.5; .5 MG/3ML; MG/3ML
3 SOLUTION RESPIRATORY (INHALATION) EVERY 2 HOUR PRN
Status: DISCONTINUED | OUTPATIENT
Start: 2024-11-26 | End: 2024-11-27

## 2024-11-26 RX ORDER — DEXTROSE 50 % IN WATER (D50W) INTRAVENOUS SYRINGE
12.5
Status: DISCONTINUED | OUTPATIENT
Start: 2024-11-26 | End: 2024-11-29 | Stop reason: HOSPADM

## 2024-11-26 RX ORDER — INSULIN LISPRO 100 [IU]/ML
0-15 INJECTION, SOLUTION INTRAVENOUS; SUBCUTANEOUS
Status: DISCONTINUED | OUTPATIENT
Start: 2024-11-27 | End: 2024-11-29 | Stop reason: HOSPADM

## 2024-11-26 RX ORDER — IPRATROPIUM BROMIDE AND ALBUTEROL SULFATE 2.5; .5 MG/3ML; MG/3ML
SOLUTION RESPIRATORY (INHALATION)
Status: COMPLETED
Start: 2024-11-26 | End: 2024-11-26

## 2024-11-26 RX ORDER — DULOXETIN HYDROCHLORIDE 60 MG/1
60 CAPSULE, DELAYED RELEASE ORAL DAILY
Status: DISCONTINUED | OUTPATIENT
Start: 2024-11-26 | End: 2024-11-29 | Stop reason: HOSPADM

## 2024-11-26 RX ORDER — ATORVASTATIN CALCIUM 80 MG/1
80 TABLET, FILM COATED ORAL NIGHTLY
Status: DISCONTINUED | OUTPATIENT
Start: 2024-11-26 | End: 2024-11-29 | Stop reason: HOSPADM

## 2024-11-26 RX ORDER — GABAPENTIN 300 MG/1
300 CAPSULE ORAL 3 TIMES DAILY
Status: DISCONTINUED | OUTPATIENT
Start: 2024-11-26 | End: 2024-11-29 | Stop reason: HOSPADM

## 2024-11-26 RX ORDER — ENOXAPARIN SODIUM 100 MG/ML
40 INJECTION SUBCUTANEOUS DAILY
Status: DISCONTINUED | OUTPATIENT
Start: 2024-11-26 | End: 2024-11-29 | Stop reason: HOSPADM

## 2024-11-26 RX ORDER — GUAIFENESIN 100 MG/5ML
200 SOLUTION ORAL EVERY 4 HOURS PRN
Status: DISCONTINUED | OUTPATIENT
Start: 2024-11-26 | End: 2024-11-29 | Stop reason: HOSPADM

## 2024-11-26 RX ORDER — FAMOTIDINE 10 MG/ML
20 INJECTION INTRAVENOUS EVERY 12 HOURS SCHEDULED
Status: DISCONTINUED | OUTPATIENT
Start: 2024-11-26 | End: 2024-11-29 | Stop reason: HOSPADM

## 2024-11-26 RX ORDER — GUAIFENESIN 600 MG/1
600 TABLET, EXTENDED RELEASE ORAL 2 TIMES DAILY PRN
Status: DISCONTINUED | OUTPATIENT
Start: 2024-11-26 | End: 2024-11-29 | Stop reason: HOSPADM

## 2024-11-26 RX ORDER — DEXAMETHASONE SODIUM PHOSPHATE 10 MG/ML
10 INJECTION INTRAMUSCULAR; INTRAVENOUS EVERY 12 HOURS
Status: DISCONTINUED | OUTPATIENT
Start: 2024-11-26 | End: 2024-11-28

## 2024-11-26 RX ORDER — DEXAMETHASONE 6 MG/1
6 TABLET ORAL DAILY
Status: DISCONTINUED | OUTPATIENT
Start: 2024-11-26 | End: 2024-11-26

## 2024-11-26 RX ORDER — IPRATROPIUM BROMIDE AND ALBUTEROL SULFATE 2.5; .5 MG/3ML; MG/3ML
3 SOLUTION RESPIRATORY (INHALATION) ONCE
Status: COMPLETED | OUTPATIENT
Start: 2024-11-26 | End: 2024-11-26

## 2024-11-26 RX ORDER — ACETAMINOPHEN 500 MG
5 TABLET ORAL NIGHTLY PRN
Status: DISCONTINUED | OUTPATIENT
Start: 2024-11-26 | End: 2024-11-29 | Stop reason: HOSPADM

## 2024-11-26 RX ORDER — IPRATROPIUM BROMIDE AND ALBUTEROL SULFATE 2.5; .5 MG/3ML; MG/3ML
3 SOLUTION RESPIRATORY (INHALATION)
Status: DISCONTINUED | OUTPATIENT
Start: 2024-11-26 | End: 2024-11-26

## 2024-11-26 RX ORDER — ASPIRIN 81 MG/1
81 TABLET ORAL DAILY
Status: DISCONTINUED | OUTPATIENT
Start: 2024-11-26 | End: 2024-11-29 | Stop reason: HOSPADM

## 2024-11-26 RX ORDER — ARIPIPRAZOLE 5 MG/1
5 TABLET ORAL DAILY
Status: DISCONTINUED | OUTPATIENT
Start: 2024-11-26 | End: 2024-11-29 | Stop reason: HOSPADM

## 2024-11-26 RX ORDER — PROPRANOLOL HYDROCHLORIDE 60 MG/1
60 CAPSULE, EXTENDED RELEASE ORAL DAILY
Status: DISCONTINUED | OUTPATIENT
Start: 2024-11-26 | End: 2024-11-29 | Stop reason: HOSPADM

## 2024-11-26 RX ORDER — DIPHENHYDRAMINE HCL 25 MG
25 TABLET ORAL EVERY 6 HOURS PRN
Status: DISCONTINUED | OUTPATIENT
Start: 2024-11-26 | End: 2024-11-29 | Stop reason: HOSPADM

## 2024-11-26 RX ORDER — DEXTROSE 50 % IN WATER (D50W) INTRAVENOUS SYRINGE
25
Status: DISCONTINUED | OUTPATIENT
Start: 2024-11-26 | End: 2024-11-29 | Stop reason: HOSPADM

## 2024-11-26 RX ORDER — METFORMIN HYDROCHLORIDE 500 MG/1
1000 TABLET, EXTENDED RELEASE ORAL
Status: DISCONTINUED | OUTPATIENT
Start: 2024-11-27 | End: 2024-11-29 | Stop reason: HOSPADM

## 2024-11-26 RX ORDER — IPRATROPIUM BROMIDE AND ALBUTEROL SULFATE 2.5; .5 MG/3ML; MG/3ML
SOLUTION RESPIRATORY (INHALATION)
Status: DISPENSED
Start: 2024-11-26 | End: 2024-11-27

## 2024-11-26 RX ORDER — FAMOTIDINE 10 MG/ML
20 INJECTION INTRAVENOUS ONCE
Status: COMPLETED | OUTPATIENT
Start: 2024-11-26 | End: 2024-11-26

## 2024-11-26 RX ORDER — IPRATROPIUM BROMIDE AND ALBUTEROL SULFATE 2.5; .5 MG/3ML; MG/3ML
3 SOLUTION RESPIRATORY (INHALATION)
Status: DISCONTINUED | OUTPATIENT
Start: 2024-11-27 | End: 2024-11-27

## 2024-11-26 SDOH — SOCIAL STABILITY: SOCIAL INSECURITY: HAVE YOU HAD ANY THOUGHTS OF HARMING ANYONE ELSE?: NO

## 2024-11-26 SDOH — ECONOMIC STABILITY: HOUSING INSECURITY: AT ANY TIME IN THE PAST 12 MONTHS, WERE YOU HOMELESS OR LIVING IN A SHELTER (INCLUDING NOW)?: NO

## 2024-11-26 SDOH — ECONOMIC STABILITY: INCOME INSECURITY: IN THE PAST 12 MONTHS HAS THE ELECTRIC, GAS, OIL, OR WATER COMPANY THREATENED TO SHUT OFF SERVICES IN YOUR HOME?: NO

## 2024-11-26 SDOH — ECONOMIC STABILITY: TRANSPORTATION INSECURITY: IN THE PAST 12 MONTHS, HAS LACK OF TRANSPORTATION KEPT YOU FROM MEDICAL APPOINTMENTS OR FROM GETTING MEDICATIONS?: NO

## 2024-11-26 SDOH — ECONOMIC STABILITY: FOOD INSECURITY: WITHIN THE PAST 12 MONTHS, YOU WORRIED THAT YOUR FOOD WOULD RUN OUT BEFORE YOU GOT THE MONEY TO BUY MORE.: NEVER TRUE

## 2024-11-26 SDOH — ECONOMIC STABILITY: HOUSING INSECURITY: IN THE LAST 12 MONTHS, WAS THERE A TIME WHEN YOU WERE NOT ABLE TO PAY THE MORTGAGE OR RENT ON TIME?: NO

## 2024-11-26 SDOH — ECONOMIC STABILITY: FOOD INSECURITY: WITHIN THE PAST 12 MONTHS, THE FOOD YOU BOUGHT JUST DIDN'T LAST AND YOU DIDN'T HAVE MONEY TO GET MORE.: NEVER TRUE

## 2024-11-26 SDOH — ECONOMIC STABILITY: HOUSING INSECURITY: IN THE PAST 12 MONTHS, HOW MANY TIMES HAVE YOU MOVED WHERE YOU WERE LIVING?: 1

## 2024-11-26 SDOH — SOCIAL STABILITY: SOCIAL INSECURITY: HAVE YOU HAD THOUGHTS OF HARMING ANYONE ELSE?: NO

## 2024-11-26 SDOH — SOCIAL STABILITY: SOCIAL INSECURITY
WITHIN THE LAST YEAR, HAVE YOU BEEN HUMILIATED OR EMOTIONALLY ABUSED IN OTHER WAYS BY YOUR PARTNER OR EX-PARTNER?: PATIENT DECLINED

## 2024-11-26 SDOH — SOCIAL STABILITY: SOCIAL INSECURITY: HAS ANYONE EVER THREATENED TO HURT YOUR FAMILY OR YOUR PETS?: NO

## 2024-11-26 SDOH — SOCIAL STABILITY: SOCIAL INSECURITY: ARE YOU OR HAVE YOU BEEN THREATENED OR ABUSED PHYSICALLY, EMOTIONALLY, OR SEXUALLY BY ANYONE?: NO

## 2024-11-26 SDOH — SOCIAL STABILITY: SOCIAL INSECURITY: DOES ANYONE TRY TO KEEP YOU FROM HAVING/CONTACTING OTHER FRIENDS OR DOING THINGS OUTSIDE YOUR HOME?: NO

## 2024-11-26 SDOH — SOCIAL STABILITY: SOCIAL INSECURITY: DO YOU FEEL ANYONE HAS EXPLOITED OR TAKEN ADVANTAGE OF YOU FINANCIALLY OR OF YOUR PERSONAL PROPERTY?: NO

## 2024-11-26 SDOH — SOCIAL STABILITY: SOCIAL INSECURITY
WITHIN THE LAST YEAR, HAVE YOU BEEN KICKED, HIT, SLAPPED, OR OTHERWISE PHYSICALLY HURT BY YOUR PARTNER OR EX-PARTNER?: PATIENT DECLINED

## 2024-11-26 SDOH — SOCIAL STABILITY: SOCIAL INSECURITY: ARE THERE ANY APPARENT SIGNS OF INJURIES/BEHAVIORS THAT COULD BE RELATED TO ABUSE/NEGLECT?: NO

## 2024-11-26 SDOH — SOCIAL STABILITY: SOCIAL INSECURITY: WERE YOU ABLE TO COMPLETE ALL THE BEHAVIORAL HEALTH SCREENINGS?: YES

## 2024-11-26 SDOH — ECONOMIC STABILITY: FOOD INSECURITY: HOW HARD IS IT FOR YOU TO PAY FOR THE VERY BASICS LIKE FOOD, HOUSING, MEDICAL CARE, AND HEATING?: NOT HARD AT ALL

## 2024-11-26 SDOH — SOCIAL STABILITY: SOCIAL INSECURITY
WITHIN THE LAST YEAR, HAVE YOU BEEN RAPED OR FORCED TO HAVE ANY KIND OF SEXUAL ACTIVITY BY YOUR PARTNER OR EX-PARTNER?: PATIENT DECLINED

## 2024-11-26 SDOH — SOCIAL STABILITY: SOCIAL INSECURITY: DO YOU FEEL UNSAFE GOING BACK TO THE PLACE WHERE YOU ARE LIVING?: NO

## 2024-11-26 SDOH — SOCIAL STABILITY: SOCIAL INSECURITY: WITHIN THE LAST YEAR, HAVE YOU BEEN AFRAID OF YOUR PARTNER OR EX-PARTNER?: PATIENT DECLINED

## 2024-11-26 SDOH — SOCIAL STABILITY: SOCIAL INSECURITY: ABUSE: ADULT

## 2024-11-26 ASSESSMENT — ACTIVITIES OF DAILY LIVING (ADL)
LACK_OF_TRANSPORTATION: NO
HEARING - LEFT EAR: FUNCTIONAL
ADEQUATE_TO_COMPLETE_ADL: YES
LACK_OF_TRANSPORTATION: NO
ADEQUATE_TO_COMPLETE_ADL: YES
HEARING - LEFT EAR: FUNCTIONAL
JUDGMENT_ADEQUATE_SAFELY_COMPLETE_DAILY_ACTIVITIES: YES
WALKS IN HOME: INDEPENDENT
WALKS IN HOME: INDEPENDENT
TOILETING: INDEPENDENT
GROOMING: INDEPENDENT
FEEDING YOURSELF: INDEPENDENT
PATIENT'S MEMORY ADEQUATE TO SAFELY COMPLETE DAILY ACTIVITIES?: YES
FEEDING YOURSELF: INDEPENDENT
HEARING - RIGHT EAR: FUNCTIONAL
DRESSING YOURSELF: INDEPENDENT
DRESSING YOURSELF: INDEPENDENT
JUDGMENT_ADEQUATE_SAFELY_COMPLETE_DAILY_ACTIVITIES: YES
TOILETING: INDEPENDENT
BATHING: INDEPENDENT
PATIENT'S MEMORY ADEQUATE TO SAFELY COMPLETE DAILY ACTIVITIES?: YES
HEARING - RIGHT EAR: FUNCTIONAL
BATHING: INDEPENDENT
GROOMING: INDEPENDENT

## 2024-11-26 ASSESSMENT — ENCOUNTER SYMPTOMS
CARDIOVASCULAR NEGATIVE: 1
ENDOCRINE NEGATIVE: 1
COUGH: 1
GASTROINTESTINAL NEGATIVE: 1
ALLERGIC/IMMUNOLOGIC NEGATIVE: 1
HEMATOLOGIC/LYMPHATIC NEGATIVE: 1
NEUROLOGICAL NEGATIVE: 1
EYES NEGATIVE: 1
CHEST TIGHTNESS: 1
SHORTNESS OF BREATH: 1
CONSTITUTIONAL NEGATIVE: 1
MUSCULOSKELETAL NEGATIVE: 1
WHEEZING: 1
PSYCHIATRIC NEGATIVE: 1

## 2024-11-26 ASSESSMENT — LIFESTYLE VARIABLES
PRESCIPTION_ABUSE_PAST_12_MONTHS: NO
AUDIT-C TOTAL SCORE: 1
SUBSTANCE_ABUSE_PAST_12_MONTHS: YES
SKIP TO QUESTIONS 9-10: 1
HOW OFTEN DO YOU HAVE A DRINK CONTAINING ALCOHOL: MONTHLY OR LESS
HOW OFTEN DO YOU HAVE 6 OR MORE DRINKS ON ONE OCCASION: NEVER
AUDIT-C TOTAL SCORE: 1
HOW MANY STANDARD DRINKS CONTAINING ALCOHOL DO YOU HAVE ON A TYPICAL DAY: 1 OR 2

## 2024-11-26 ASSESSMENT — COGNITIVE AND FUNCTIONAL STATUS - GENERAL
DAILY ACTIVITIY SCORE: 24
PATIENT BASELINE BEDBOUND: NO
WALKING IN HOSPITAL ROOM: A LITTLE
MOBILITY SCORE: 22
CLIMB 3 TO 5 STEPS WITH RAILING: A LITTLE

## 2024-11-26 ASSESSMENT — PATIENT HEALTH QUESTIONNAIRE - PHQ9
1. LITTLE INTEREST OR PLEASURE IN DOING THINGS: NOT AT ALL
2. FEELING DOWN, DEPRESSED OR HOPELESS: NOT AT ALL
SUM OF ALL RESPONSES TO PHQ9 QUESTIONS 1 & 2: 0
2. FEELING DOWN, DEPRESSED OR HOPELESS: NOT AT ALL
SUM OF ALL RESPONSES TO PHQ9 QUESTIONS 1 & 2: 0
1. LITTLE INTEREST OR PLEASURE IN DOING THINGS: NOT AT ALL

## 2024-11-26 ASSESSMENT — COLUMBIA-SUICIDE SEVERITY RATING SCALE - C-SSRS
1. IN THE PAST MONTH, HAVE YOU WISHED YOU WERE DEAD OR WISHED YOU COULD GO TO SLEEP AND NOT WAKE UP?: NO
6. HAVE YOU EVER DONE ANYTHING, STARTED TO DO ANYTHING, OR PREPARED TO DO ANYTHING TO END YOUR LIFE?: NO
2. HAVE YOU ACTUALLY HAD ANY THOUGHTS OF KILLING YOURSELF?: NO

## 2024-11-26 ASSESSMENT — PAIN - FUNCTIONAL ASSESSMENT: PAIN_FUNCTIONAL_ASSESSMENT: 0-10

## 2024-11-26 ASSESSMENT — PAIN SCALES - GENERAL: PAINLEVEL_OUTOF10: 0 - NO PAIN

## 2024-11-26 NOTE — ED PROVIDER NOTES
HPI   Chief Complaint   Patient presents with   • Allergic Reaction     Pt states she at lunch and approx 40 minutes after states her head started itching, then had eye swelling, difficulty breathing, flushed face, hives, and vomiting.        HPI    Patient is a 59-year-old female with a history of smoking presenting for evaluation of suspected allergic reaction.  Patient states that she ate approximately 2 hours ago when approximately 30 minutes after eating her head started itching.  She then developed eye swelling and difficulty breathing with a flushed face and hives.  This is when she decided to call 911 for evaluation.  EMS applied 50 mg of Benadryl IV en route.  Patient denies feelings of throat swelling or tongue swelling.  He states she has had a cough over the last several days.  No associated chest pain or shortness of breath.  No abdominal pain, nausea, vomiting, diarrhea or constipation.  No dysuria or hematuria.  No fever or chills.    Patient History   No past medical history on file.  Past Surgical History:   Procedure Laterality Date   • MR HEAD ANGIO WO IV CONTRAST  1/3/2023    MR HEAD ANGIO WO IV CONTRAST 1/3/2023 [facility]   • MR NECK ANGIO WO IV CONTRAST  1/3/2023    MR NECK ANGIO WO IV CONTRAST 1/3/2023 [facility]     Family History   Problem Relation Name Age of Onset   • Breast cancer Father's Sister     • Breast cancer Mother's Sister       Social History     Tobacco Use   • Smoking status: Former     Current packs/day: 0.50     Types: Cigarettes   • Smokeless tobacco: Never   Substance Use Topics   • Alcohol use: Not Currently   • Drug use: Not Currently       Physical Exam   ED Triage Vitals [11/26/24 1625]   Temperature Heart Rate Respirations BP   36.3 °C (97.4 °F) 74 18 (!) 135/99      Pulse Ox Temp Source Heart Rate Source Patient Position   (!) 89 % Axillary -- --      BP Location FiO2 (%)     Left arm --       Physical Exam  Vitals and nursing note reviewed.    Constitutional:       Appearance: Normal appearance.   HENT:      Head: Normocephalic and atraumatic.      Mouth/Throat:      Mouth: Mucous membranes are moist.      Pharynx: Oropharynx is clear. No oropharyngeal exudate or posterior oropharyngeal erythema.      Comments: Oropharynx is patent.  Eyes:      Extraocular Movements: Extraocular movements intact.      Pupils: Pupils are equal, round, and reactive to light.   Cardiovascular:      Rate and Rhythm: Normal rate and regular rhythm.   Pulmonary:      Effort: Pulmonary effort is normal.      Comments: Audible wheezing to the bilateral lobes.  Abdominal:      General: Abdomen is flat. Bowel sounds are normal.      Palpations: Abdomen is soft.   Skin:     General: Skin is warm.      Comments: Flushed face and chest with some mild swelling of the eyelids.   Neurological:      Mental Status: She is alert.           ED Course & MDM   Diagnoses as of 11/26/24 1845   Acute hypoxic respiratory failure (Multi)   COVID-19                 No data recorded     [place] Coma Scale Score: 15 (11/26/24 1621 : Elsa May RN)                           Medical Decision Making  Parts of this chart have been completed using voice recognition software. Please excuse any errors of transcription. Despite the medical decision making time stamp above-my medical decision making has taken place during the patient's entire visit. My thought process and reason for plan has been formulated from the time that I saw the patient until the time of disposition and is not specific to one specific moment during their visit and furthermore my MDM encompasses this entire chart and not only this text box.      HPI: Detailed above.    Exam: A medically appropriate exam performed, outlined above, given the known history and presentation.    History obtained from: Patient    Social Determinants of Health considered during this visit: From home    EKG interpreted by my attending physician, reviewed by  myself.    Labs Reviewed   CBC WITH AUTO DIFFERENTIAL   COMPREHENSIVE METABOLIC PANEL   TROPONIN SERIES- (INITIAL, 1 HR)    Narrative:     The following orders were created for panel order Troponin Series, (0, 1 HR).  Procedure                               Abnormality         Status                     ---------                               -----------         ------                     Troponin I, High Sensiti...[[id number]]                                                   Please view results for these tests on the individual orders.   MAGNESIUM   SARS-COV-2 PCR   INFLUENZA A AND B PCR   SERIAL TROPONIN-INITIAL     XR chest 1 view    (Results Pending)     Medications   dexAMETHasone (Decadron) injection 10 mg (has no administration in time range)   famotidine PF (Pepcid) injection 20 mg (has no administration in time range)   ipratropium-albuteroL (Duo-Neb) 0.5-2.5 mg/3 mL nebulizer solution 3 mL (has no administration in time range)     Differential diagnoses considered for this visit include: Acute allergic reaction versus anaphylaxis     Considerations/further MDM:    Patient is a 59-year-old female presenting for evaluation of a suspected allergic reaction.  On arrival, patient was saturating at 90% on room air.  She was then placed on 6 L nasal cannula and was saturating above 95%.  There was audible wheezing to the bilateral lung fields.  Patient was administered 50 mg of Benadryl in the field and states that she is feeling better.  Patient had a patent oropharynx without any evidence of acute respiratory distress.  No lip or tongue swelling.  Decadron, Pepcid and DuoNeb ordered for medications with a further cardiac workup given patient's complaints of cough the past several days. Chest x-ray showed pulmonary vascular congestion without evidence of acute cardiopulmonary process.  Viral testing was positive for COVID-19 infection.  CMP was unremarkable.  CBC showed mild leukocytosis with a white count of  14.9.  Magnesium was within normal limits.  Initial troponin of 6, repeat of 6.  On repeat assessment of the patient, wheezing had subsided patient still had coarse lung sounds.  Patient's oxygen was removed and she was still saturating 88% on room air.  Given patient's COVID-19 diagnosis and requirement for oxygen, admission to the hospital was recommended.  I discussed this with the patient at the bedside, who was willing to be admitted at this time.  I discussed this with the on-call hospitalist, and the patient was admitted to the regular nursing floor with telemetry in good condition.    The patient/family was counseled on clinical impression, expectations, and plan along with recommendations to admission. All questions were answered and involved parties were understanding and agreeable to course of treatment. Case was discussed with admitting physician and any consultants. Bed type, ED treatment and further ED workup decided by joint decision making with admitting team and any consultants. Patient stable for admission per my assessment and further management of patient will be deferred to the inpatient setting.    Patient was seen in conjunction with attending physician Dr. Kelly Castanon.   Patient's history, physical exam, diagnostic studies, and treatment plan were discussed thoroughly.    Procedure  Procedures     Chela Tineo PA-C  11/26/24 8284

## 2024-11-26 NOTE — PROGRESS NOTES
Attestation/Supervisory note for RIO Tineo      The patient is a 59-year-old female presenting to the emergency department by EMS for evaluation of a possible allergic reaction.  The patient states that she has had a recent cough and congestion over the past several days.  She states that she was hoping it would go away.  She states that otherwise she has been feeling okay.  She states that she did go to eat at [employer] about 2 hours prior to arrival.  She states that after she ate there she started developing some itching and some facial swelling about 30 minutes afterward.  She states that she has not had that type of reaction in the past.  She does smoke daily.  She denies any headache or visual changes.  She denies any chest pain.  She does have some shortness of breath.  She states that she feels like her tongue is tingling.  She does not have any palpitations.  No diaphoresis.  No abdominal pain.  No nausea vomiting.  No diarrhea or constipation but no urinary complaints.  All pertinent positives and negatives are recorded above.  All other systems reviewed and otherwise negative.  Vital signs with diastolic hypertension and borderline hypoxia but otherwise within normal limits.  Physical exam with a well-nourished well-developed female in no acute distress.  HEENT exam with some flushed appearance to her face and scattered urticaria but she has no pooling of secretions.  No stridor.  She is able to converse without difficulty.  Abdominal exam is benign.  She does not have any gross motor, neurologic or vascular episodes on exam.  Pulses are equal bilaterally.      EKG with normal sinus rhythm at 71 bpm, left bundle branch block pattern, normal axis, normal ST segment, and normal T waves      IV Decadron, IV Pepcid, IV Benadryl, DuoNeb ordered.      Diagnostic labs with leukocytosis, mild hyperglycemia and positive COVID-19 test but otherwise unremarkable.      Initial troponin 6.  Repeat troponin 6      XR  chest 1 view   Final Result   Pulmonary vascular congestion. No evidence of acute cardiopulmonary   process.             MACRO:   None        Signed by: Sean Saleem 11/26/2024 5:07 PM   Dictation workstation:   [id number]           The patient has evidence of an allergic reaction but no evidence of airway compromise at this time.  She was given medications with improvement in her symptoms.  EKG and cardiac enzymes without evidence of ischemia.  She does report that she has had cough and congestion symptoms over the past several days.  She did have a positive COVID-19 test in the emergency department.  Chest x-ray with pulmonary vascular congestion but no evidence of widening of the mediastinum.  Pulses are equal bilaterally.  No evidence of pneumonia.  No evidence of pneumothorax.  The patient is well-perfused on exam and does not have evidence of sepsis.  She did have a trial of ambulation with a pulse ox dropping to 88% on room air and was admitted for further management of suspected COPD exacerbation complicated by COVID-19        Impression/diagnosis:  1.  Allergic reaction  2.  Dyspnea, dyspnea on exertion  3.  Nonproductive cough  4.  Diastolic hypertension  5.  COVID-19  6.  COPD exacerbation      Critical care time of 17 minutes billed for management of an allergic reaction with initiation of IV Decadron, initiation of IV Benadryl, initiation of IV Pepcid, monitoring of the patient on telemetry, consultation with the patient regarding her results.  This time excludes time for billable procedures.      critical care time billed for by me is non concurrent with time billed for by RIO Tineo      I personally saw the patient and made/approve the management plan and take responsibility for the patient management.      I independently interpreted the following study (S) EKG and diagnostic labs      I personally discussed the patient's management with the patient      I reviewed the results of the diagnostic  labs and diagnostic imaging.  Formal radiology read was completed by the radiologist.      Kelly Castanon MD

## 2024-11-27 LAB
AMPHETAMINES UR QL SCN: ABNORMAL
ANION GAP SERPL CALCULATED.3IONS-SCNC: 13 MMOL/L (ref 10–20)
APPEARANCE UR: CLEAR
BARBITURATES UR QL SCN: ABNORMAL
BENZODIAZ UR QL SCN: ABNORMAL
BILIRUB UR STRIP.AUTO-MCNC: NEGATIVE MG/DL
BNP SERPL-MCNC: 27 PG/ML (ref 0–99)
BUN SERPL-MCNC: 12 MG/DL (ref 6–23)
BZE UR QL SCN: ABNORMAL
CALCIUM SERPL-MCNC: 9.7 MG/DL (ref 8.6–10.3)
CANNABINOIDS UR QL SCN: ABNORMAL
CHLORIDE SERPL-SCNC: 100 MMOL/L (ref 98–107)
CO2 SERPL-SCNC: 28 MMOL/L (ref 21–32)
COLOR UR: ABNORMAL
CREAT SERPL-MCNC: 0.48 MG/DL (ref 0.5–1.05)
EGFRCR SERPLBLD CKD-EPI 2021: >90 ML/MIN/1.73M*2
ERYTHROCYTE [DISTWIDTH] IN BLOOD BY AUTOMATED COUNT: 12.8 % (ref 11.5–14.5)
EST. AVERAGE GLUCOSE BLD GHB EST-MCNC: 146 MG/DL
FENTANYL+NORFENTANYL UR QL SCN: ABNORMAL
GLUCOSE BLD MANUAL STRIP-MCNC: 143 MG/DL (ref 74–99)
GLUCOSE BLD MANUAL STRIP-MCNC: 161 MG/DL (ref 74–99)
GLUCOSE BLD MANUAL STRIP-MCNC: 195 MG/DL (ref 74–99)
GLUCOSE BLD MANUAL STRIP-MCNC: 198 MG/DL (ref 74–99)
GLUCOSE SERPL-MCNC: 169 MG/DL (ref 74–99)
GLUCOSE UR STRIP.AUTO-MCNC: ABNORMAL MG/DL
HBA1C MFR BLD: 6.7 %
HCT VFR BLD AUTO: 47.1 % (ref 36–46)
HGB BLD-MCNC: 15.7 G/DL (ref 12–16)
HOLD SPECIMEN: NORMAL
KETONES UR STRIP.AUTO-MCNC: NEGATIVE MG/DL
LACTATE SERPL-SCNC: 0.9 MMOL/L (ref 0.4–2)
LEUKOCYTE ESTERASE UR QL STRIP.AUTO: NEGATIVE
MCH RBC QN AUTO: 29.7 PG (ref 26–34)
MCHC RBC AUTO-ENTMCNC: 33.3 G/DL (ref 32–36)
MCV RBC AUTO: 89 FL (ref 80–100)
METHADONE UR QL SCN: ABNORMAL
NITRITE UR QL STRIP.AUTO: NEGATIVE
NRBC BLD-RTO: 0 /100 WBCS (ref 0–0)
OPIATES UR QL SCN: ABNORMAL
OXYCODONE+OXYMORPHONE UR QL SCN: ABNORMAL
PCP UR QL SCN: ABNORMAL
PH UR STRIP.AUTO: 5.5 [PH]
PLATELET # BLD AUTO: 317 X10*3/UL (ref 150–450)
POTASSIUM SERPL-SCNC: 4.2 MMOL/L (ref 3.5–5.3)
PROT UR STRIP.AUTO-MCNC: NEGATIVE MG/DL
RBC # BLD AUTO: 5.28 X10*6/UL (ref 4–5.2)
RBC # UR STRIP.AUTO: NEGATIVE /UL
SODIUM SERPL-SCNC: 137 MMOL/L (ref 136–145)
SP GR UR STRIP.AUTO: 1.05
UROBILINOGEN UR STRIP.AUTO-MCNC: NORMAL MG/DL
WBC # BLD AUTO: 7.9 X10*3/UL (ref 4.4–11.3)

## 2024-11-27 PROCEDURE — 2500000001 HC RX 250 WO HCPCS SELF ADMINISTERED DRUGS (ALT 637 FOR MEDICARE OP): Performed by: STUDENT IN AN ORGANIZED HEALTH CARE EDUCATION/TRAINING PROGRAM

## 2024-11-27 PROCEDURE — 2500000001 HC RX 250 WO HCPCS SELF ADMINISTERED DRUGS (ALT 637 FOR MEDICARE OP): Performed by: INTERNAL MEDICINE

## 2024-11-27 PROCEDURE — 94667 MNPJ CHEST WALL 1ST: CPT

## 2024-11-27 PROCEDURE — XW033E5 INTRODUCTION OF REMDESIVIR ANTI-INFECTIVE INTO PERIPHERAL VEIN, PERCUTANEOUS APPROACH, NEW TECHNOLOGY GROUP 5: ICD-10-PCS | Performed by: NURSE PRACTITIONER

## 2024-11-27 PROCEDURE — 99254 IP/OBS CNSLTJ NEW/EST MOD 60: CPT | Performed by: STUDENT IN AN ORGANIZED HEALTH CARE EDUCATION/TRAINING PROGRAM

## 2024-11-27 PROCEDURE — 94640 AIRWAY INHALATION TREATMENT: CPT

## 2024-11-27 PROCEDURE — 2500000004 HC RX 250 GENERAL PHARMACY W/ HCPCS (ALT 636 FOR OP/ED): Mod: JZ | Performed by: NURSE PRACTITIONER

## 2024-11-27 PROCEDURE — 2500000002 HC RX 250 W HCPCS SELF ADMINISTERED DRUGS (ALT 637 FOR MEDICARE OP, ALT 636 FOR OP/ED): Performed by: INTERNAL MEDICINE

## 2024-11-27 PROCEDURE — 85027 COMPLETE CBC AUTOMATED: CPT | Performed by: INTERNAL MEDICINE

## 2024-11-27 PROCEDURE — 87040 BLOOD CULTURE FOR BACTERIA: CPT | Mod: WESLAB | Performed by: INTERNAL MEDICINE

## 2024-11-27 PROCEDURE — S4991 NICOTINE PATCH NONLEGEND: HCPCS | Performed by: INTERNAL MEDICINE

## 2024-11-27 PROCEDURE — 80307 DRUG TEST PRSMV CHEM ANLYZR: CPT | Performed by: STUDENT IN AN ORGANIZED HEALTH CARE EDUCATION/TRAINING PROGRAM

## 2024-11-27 PROCEDURE — 2500000004 HC RX 250 GENERAL PHARMACY W/ HCPCS (ALT 636 FOR OP/ED): Performed by: INTERNAL MEDICINE

## 2024-11-27 PROCEDURE — 1200000002 HC GENERAL ROOM WITH TELEMETRY DAILY

## 2024-11-27 PROCEDURE — 82374 ASSAY BLOOD CARBON DIOXIDE: CPT | Performed by: INTERNAL MEDICINE

## 2024-11-27 PROCEDURE — 99232 SBSQ HOSP IP/OBS MODERATE 35: CPT | Performed by: INTERNAL MEDICINE

## 2024-11-27 PROCEDURE — 82947 ASSAY GLUCOSE BLOOD QUANT: CPT

## 2024-11-27 PROCEDURE — 9420000001 HC RT PATIENT EDUCATION 5 MIN

## 2024-11-27 PROCEDURE — 83605 ASSAY OF LACTIC ACID: CPT | Performed by: INTERNAL MEDICINE

## 2024-11-27 PROCEDURE — 81003 URINALYSIS AUTO W/O SCOPE: CPT | Performed by: INTERNAL MEDICINE

## 2024-11-27 PROCEDURE — 83880 ASSAY OF NATRIURETIC PEPTIDE: CPT | Performed by: STUDENT IN AN ORGANIZED HEALTH CARE EDUCATION/TRAINING PROGRAM

## 2024-11-27 PROCEDURE — 36415 COLL VENOUS BLD VENIPUNCTURE: CPT | Performed by: INTERNAL MEDICINE

## 2024-11-27 RX ORDER — IPRATROPIUM BROMIDE AND ALBUTEROL SULFATE 2.5; .5 MG/3ML; MG/3ML
3 SOLUTION RESPIRATORY (INHALATION) 4 TIMES DAILY
Status: DISCONTINUED | OUTPATIENT
Start: 2024-11-28 | End: 2024-11-28

## 2024-11-27 RX ORDER — DOXYCYCLINE 100 MG/1
100 CAPSULE ORAL EVERY 12 HOURS SCHEDULED
Status: DISCONTINUED | OUTPATIENT
Start: 2024-11-27 | End: 2024-11-29 | Stop reason: HOSPADM

## 2024-11-27 RX ORDER — ALBUTEROL SULFATE 0.83 MG/ML
2.5 SOLUTION RESPIRATORY (INHALATION) EVERY 2 HOUR PRN
Status: DISCONTINUED | OUTPATIENT
Start: 2024-11-27 | End: 2024-11-29 | Stop reason: HOSPADM

## 2024-11-27 RX ORDER — AMLODIPINE BESYLATE 5 MG/1
5 TABLET ORAL DAILY
Status: DISCONTINUED | OUTPATIENT
Start: 2024-11-27 | End: 2024-11-29 | Stop reason: HOSPADM

## 2024-11-27 ASSESSMENT — COGNITIVE AND FUNCTIONAL STATUS - GENERAL
MOBILITY SCORE: 24
DAILY ACTIVITIY SCORE: 24
MOBILITY SCORE: 24
DAILY ACTIVITIY SCORE: 24

## 2024-11-27 ASSESSMENT — ENCOUNTER SYMPTOMS
FATIGUE: 1
SHORTNESS OF BREATH: 1
COUGH: 1
NAUSEA: 1
FEVER: 0
VOMITING: 1
ABDOMINAL PAIN: 0
CHILLS: 0
DIARRHEA: 0

## 2024-11-27 ASSESSMENT — PAIN SCALES - GENERAL
PAINLEVEL_OUTOF10: 0 - NO PAIN
PAINLEVEL_OUTOF10: 0 - NO PAIN
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
## 2024-11-27 NOTE — CONSULTS
Pulmonary Consultation Note   Subjective    Ashley Kessler is a 59 y.o. year old female patient known with hx of cocaine use, vaping, smoker, asthma-like picture , DM admitted on 11/26/2024 with following wheezing, acute hypoxic respiratory failure, Covid 19 +, prior right pneumothorax, s/p chest tube in 2018 after an accident and presentation to the ED after having hives, flushing of the skin, and difficulty breathing after having food with a friend. Required 6 liter of oxygen in ED.    History of Present Illness:  Patient mentions that 5 days ago she started to have a dry cough with sore throat but did not think a lot about it.  She denies worsening shortness of breath then.  She mentions that today when she was in a restaurant she ate chicken with spicy food in an Asian restaurant with a new type of breath and she is not sure really all the ingredients.  She mentions that symptoms started with dizziness 15 minutes after, flushing and tingling and itching in her face.  She mentions that she felt like her face was swollen with and she started feeling short of breath.  She does not recall EMS giving her an EpiPen/. she does mention a history of hives as a child.    She reports history of bronchitis as a young person requiring albuterol and antibiotics.  She did require intubation last year after her accident.  She denies swelling of her legs, denies fever or chills.  Unable to produce a cough      Past Medical History  She has no past medical history on file.    Surgical History  She has a past surgical history that includes MR angio head wo IV contrast (1/3/2023) and MR angio neck wo IV contrast (1/3/2023).     Social History  She reports that she has quit smoking. Her smoking use included cigarettes. She has never used smokeless tobacco. She reports that she does not currently use alcohol. She reports that she does not currently use drugs.  Cigarette smoking history:   Marijuana use:  Vaping:  Pets:  "  Asbestos:  Other specific exposure:      Family History  Family History   Problem Relation Name Age of Onset   • Breast cancer Father's Sister     • Breast cancer Mother's Sister          Allergies  Patient has no known allergies.    Review of Systems         Meds    Scheduled medications  ARIPiprazole, 5 mg, oral, Daily  aspirin, 81 mg, oral, Daily  atorvastatin, 80 mg, oral, Nightly  dexAMETHasone, 10 mg, intravenous, q12h  DULoxetine, 60 mg, oral, Daily  empagliflozin, 10 mg, oral, Daily  enoxaparin, 40 mg, subcutaneous, Daily  famotidine, 20 mg, intravenous, q12h RAZA  gabapentin, 300 mg, oral, TID  insulin lispro, 0-15 Units, subcutaneous, TID AC  ipratropium-albuteroL, 3 mL, nebulization, TID  losartan, 75 mg, oral, Daily  metFORMIN XR, 1,000 mg, oral, Daily with breakfast  nicotine, 1 patch, transdermal, Daily  propranolol LA, 60 mg, oral, Daily  remdesivir, 200 mg, intravenous, Once   Followed by  [START ON 11/28/2024] remdesivir, 100 mg, intravenous, q24h      Continuous medications     PRN medications  PRN medications: acetaminophen, cyclobenzaprine, dextrose, dextrose, diphenhydrAMINE, glucagon, glucagon, guaiFENesin, guaiFENesin, ipratropium-albuteroL, melatonin     Objective      Last Recorded Vitals  /90 (BP Location: Right arm, Patient Position: Lying)   Pulse 85   Temp 37 °C (98.6 °F) (Oral)   Resp 18   Wt 90 kg (198 lb 6.6 oz)   SpO2 98%     Blood pressure 147/90, pulse 85, temperature 37 °C (98.6 °F), temperature source Oral, resp. rate 18, height 1.575 m (5' 2\"), weight 90 kg (198 lb 6.6 oz), SpO2 98%.   Physical Exam   GENERAL: No respiratory distress  HEAD/SINUSES: 4 L NC  LUNGS: Rhonchi and wheezing   CARDIAC:  Regular rate and rhythm  EXTREMITIES: No edema  NEURO: grossly normal mental status  SKIN: Skin turgor normal.   PSYCH: Normal affect    Intake/Output Summary (Last 24 hours) at 11/27/2024 1104  Last data filed at 11/27/2024 0409  Gross per 24 hour   Intake 100 ml   Output -- "   Net 100 ml     Labs:   Results from last 72 hours   Lab Units 11/27/24  0523 11/26/24  1638   SODIUM mmol/L 137 139   POTASSIUM mmol/L 4.2 4.0   CHLORIDE mmol/L 100 101   CO2 mmol/L 28 29   BUN mg/dL 12 13   CREATININE mg/dL 0.48* 0.64   GLUCOSE mg/dL 169* 182*   CALCIUM mg/dL 9.7 10.0   ANION GAP mmol/L 13 13   EGFR mL/min/1.73m*2 >90 >90      Results from last 72 hours   Lab Units 11/27/24  0523 11/26/24  2118 11/26/24  1739   WBC AUTO x10*3/uL 7.9 13.1* 14.9*   HEMOGLOBIN g/dL 15.7 16.3* 17.4*   HEMATOCRIT % 47.1* 50.8* 53.5*   PLATELETS AUTO x10*3/uL 317 302 368   NEUTROS PCT AUTO %  --  90.6 77.3   LYMPHS PCT AUTO %  --  7.1 15.9   MONOS PCT AUTO %  --  1.5 5.7   EOS PCT AUTO %  --  0.1 0.6               Micro/ID:   Lab Results   Component Value Date    BLOODCULT Loaded on Instrument - Culture in progress 11/27/2024    BLOODCULT Loaded on Instrument - Culture in progress 11/27/2024     Summary of key imaging results from the last 24 hours  CT chest with mosaicism/ air trapping seen on expiration  , seen before in July 2024. Non contrast study without the air trapping or GGO appearance     Impression   Ashley Kessler is a 59 y.o. year old female patient known with hx of cocaine use, vaping, smoker, DM admitted on 11/26/2024 with following wheezing, acute hypoxic respiratory failure, Covid 19 +, prior right pneumothorax, s/p chest tube in 2018 after an accident and presentation to the ED after having hives, flushing of the skin, and difficulty breathing after having food with a friend. Required 6 liter of oxygen in ED.  Acute hypoxic respiratory failure most probably due to hyperactive airway disease due to substance use, history of cocaine use few days before presentation, vaping on an asthma hx background and this likely COPD as no obstructive lung process on PFTs done in July were normal, however patient does have mosaicism on the CT chest concerning for air trapping.  PFTs did not have any lung volumes  assessment.  Therefore cannot exclude completely a smoking-related lung disease however there is no nodular appearance to signify RB ILD and imaging done with contrast and on expiratory film so its hard to assess   History of pneumothorax before this chest tube placement  Pleural right chest scar present sequelae of chest tube placement measuring 1.2 cm  COVID-19 +  Cocaine use , trop neg   Possible angioedema picture, hives there was no mention of facial swelling in the emergency department, urticaria though noted in the ED  Reactive airway vs asthma: PFT with BD response neg for obstruction, with significant substance abuse hx     Recommendations   As follows:  Agree with steroid use for asthma exacerbation vs reactive airway response to substance abuse, can switch tomorrow to 6 mg dexamethasone daily for 5-7 days  Scheduled DuoNeb 4 times daily, with albuterol every 2 hours as needed  Will start doxycycline 100 mg twice daily for bronchitis picture, no pneumonia or consolidation on imaging  Recommend TTE  Recommend allergy consult, if not available in house then recommend outpatient follow-up with allergist  C4 complement level  Re evaluate the use of ARB  Wean oxygen as tolerated  Bronchopulmonary hygiene with Incentive spirometry and Acapella   Sputum culture  Drug urine screen  Will need imaging outpatient in 6-8 weeks to follow up on lung parenchyma and FU on subpleural nodule   FU with OP pulmonologist (Dr. Mohan)    Wendy Hope MD   11/27/24 at 11:04 AM     -Only the Medical problems listed under impression were addressed today.   -Please contact primary team for all other concerns and medical problem  -Thank you for your consult     Disclaimer: Documentation completed with the information available at the time of input. Parts of this note may have been scribed or generated using voice dictation software, Dragon.  Homophonic errors may exist.  Please contact me directly if clarification is needed. The  times in the chart may not be reflective of actual patient care times, interventions, or procedures. Documentation occurs after the physical care of the patient.

## 2024-11-27 NOTE — CARE PLAN
The patient's goals for the shift include      The clinical goals for the shift include vitals will remain stable overnight      Problem: Pain - Adult  Goal: Verbalizes/displays adequate comfort level or baseline comfort level  Outcome: Progressing     Problem: Safety - Adult  Goal: Free from fall injury  Outcome: Progressing     Problem: Discharge Planning  Goal: Discharge to home or other facility with appropriate resources  Outcome: Progressing     Problem: Chronic Conditions and Co-morbidities  Goal: Patient's chronic conditions and co-morbidity symptoms are monitored and maintained or improved  Outcome: Progressing     Problem: Respiratory  Goal: Clear secretions with interventions this shift  Outcome: Progressing  Goal: Minimize anxiety/maximize coping throughout shift  Outcome: Progressing  Goal: Minimal/no exertional discomfort or dyspnea this shift  Outcome: Progressing  Goal: No signs of respiratory distress (eg. Use of accessory muscles. Peds grunting)  Outcome: Progressing  Goal: Patent airway maintained this shift  Outcome: Progressing  Goal: Tolerate mechanical ventilation evidenced by VS/agitation level this shift  Outcome: Progressing  Goal: Tolerate pulmonary toileting this shift  Outcome: Progressing  Goal: Verbalize decreased shortness of breath this shift  Outcome: Progressing  Goal: Wean oxygen to maintain O2 saturation per order/standard this shift  Outcome: Progressing  Goal: Increase self care and/or family involvement in next 24 hours  Outcome: Progressing      xanax 0.25 mg po

## 2024-11-27 NOTE — CARE PLAN
The patient's goals for the shift include      The clinical goals for the shift include feel better    Problem: Pain - Adult  Goal: Verbalizes/displays adequate comfort level or baseline comfort level  Outcome: Progressing     Problem: Safety - Adult  Goal: Free from fall injury  Outcome: Progressing     Problem: Discharge Planning  Goal: Discharge to home or other facility with appropriate resources  Outcome: Progressing     Problem: Chronic Conditions and Co-morbidities  Goal: Patient's chronic conditions and co-morbidity symptoms are monitored and maintained or improved  Outcome: Progressing     Problem: Respiratory  Goal: Clear secretions with interventions this shift  Outcome: Progressing  Goal: Minimize anxiety/maximize coping throughout shift  Outcome: Progressing  Goal: Minimal/no exertional discomfort or dyspnea this shift  Outcome: Progressing  Goal: No signs of respiratory distress (eg. Use of accessory muscles. Peds grunting)  Outcome: Progressing  Goal: Patent airway maintained this shift  Outcome: Progressing  Goal: Tolerate mechanical ventilation evidenced by VS/agitation level this shift  Outcome: Progressing  Goal: Tolerate pulmonary toileting this shift  Outcome: Progressing  Goal: Verbalize decreased shortness of breath this shift  Outcome: Progressing  Goal: Wean oxygen to maintain O2 saturation per order/standard this shift  Outcome: Progressing  Goal: Increase self care and/or family involvement in next 24 hours  Outcome: Progressing

## 2024-11-27 NOTE — PROGRESS NOTES
[facility] called patient's room several times and every time patient requested that I call back. Last [facility] 24. If case management needs arise please place consult.      Nilam Headley RN

## 2024-11-27 NOTE — CONSULTS
"Inpatient consult to Infectious Diseases  Consult performed by: Emili Bellamy, APRN-CNP  Consult ordered by: Jewell Silva MD  Reason for consult: COVID            Primary MD: Christopher Munoz MD        History Of Present Illness  Ashley Kessler \"Ted\" is a 59 y.o. female presenting with shortness of breath.  Reports chest congestion for about a week, yesterday she abruptly developed shortness of breath.  She tested positive for coronavirus.  She was hypoxic, requiring 6LNC, currently she is on 5LNC.  She was started on dexamethasone.  She is afebrile  Reports shortness of breath and a cough. No chest pain  Reports nausea and vomiting yesterday. No diarrhea or abdominal pain       Past Medical History  Type 2 diabetes  Hypertension  Adjustment disorder, depression  History of polysubstance abuse  Dyslipidemia    Surgical History  She has a past surgical history that includes MR angio head wo IV contrast (1/3/2023) and MR angio neck wo IV contrast (1/3/2023).     Social History     Occupational History    Not on file   Tobacco Use    Smoking status: Former     Current packs/day: 0.50     Types: Cigarettes    Smokeless tobacco: Never   Substance and Sexual Activity    Alcohol use: Not Currently    Drug use: Not Currently    Sexual activity: Not on file     Travel History   Travel since 10/27/24    No documented travel since 10/27/24                Family History  Family History   Problem Relation Name Age of Onset    Breast cancer Father's Sister      Breast cancer Mother's Sister       Allergies  Patient has no known allergies.     Immunization History   Administered Date(s) Administered    Flu vaccine, quadrivalent, no egg protein, age 6 month or greater (FLUCELVAX) 11/12/2022    Moderna COVID-19 vaccine, bivalent, blue cap/gray label *Check age/dose* 05/04/2021, 11/12/2022    Moderna SARS-CoV-2 Vaccination 11/20/2021    Pfizer Purple Cap SARS-CoV-2 04/06/2021    Pneumococcal polysaccharide vaccine, " 23-valent, age 2 years and older (PNEUMOVAX 23) 11/12/2022     Medications  Home medications:  Medications Prior to Admission   Medication Sig Dispense Refill Last Dose/Taking    ARIPiprazole (Abilify) 5 mg tablet Take 1 tablet (5 mg) by mouth once daily.       aspirin 81 mg EC tablet Take 1 tablet (81 mg) by mouth once daily.       atorvastatin (Lipitor) 80 mg tablet Take 1 tablet (80 mg) by mouth once daily.       cyclobenzaprine (Flexeril) 10 mg tablet Take 1 tablet (10 mg) by mouth 3 times a day as needed for muscle spasms. 90 tablet 0     DULoxetine (Cymbalta) 60 mg DR capsule Take 1 capsule (60 mg) by mouth once daily.       empagliflozin (Jardiance) 10 mg Take 1 tablet (10 mg) by mouth once daily.       gabapentin (Neurontin) 300 mg capsule Take 1 capsule (300 mg) by mouth 3 times a day. 90 capsule 0     losartan (Cozaar) 25 mg tablet Take 3 tablets (75 mg) by mouth once daily.       metFORMIN  mg 24 hr tablet Take 2 tablets (1,000 mg) by mouth once daily with breakfast. (Patient not taking: Reported on 11/27/2024)   Not Taking    propranolol LA (Inderal LA) 60 mg 24 hr capsule Take 1 capsule (60 mg) by mouth once daily.        Current medications:  Scheduled medications  ARIPiprazole, 5 mg, oral, Daily  aspirin, 81 mg, oral, Daily  atorvastatin, 80 mg, oral, Nightly  dexAMETHasone, 10 mg, intravenous, q12h  DULoxetine, 60 mg, oral, Daily  empagliflozin, 10 mg, oral, Daily  enoxaparin, 40 mg, subcutaneous, Daily  famotidine, 20 mg, intravenous, q12h RAZA  gabapentin, 300 mg, oral, TID  insulin lispro, 0-15 Units, subcutaneous, TID AC  ipratropium-albuteroL, 3 mL, nebulization, TID  losartan, 75 mg, oral, Daily  metFORMIN XR, 1,000 mg, oral, Daily with breakfast  nicotine, 1 patch, transdermal, Daily  propranolol LA, 60 mg, oral, Daily  remdesivir, 200 mg, intravenous, Once   Followed by  [START ON 11/28/2024] remdesivir, 100 mg, intravenous, q24h      Continuous medications     PRN medications  PRN  "medications: acetaminophen, cyclobenzaprine, dextrose, dextrose, diphenhydrAMINE, glucagon, glucagon, guaiFENesin, guaiFENesin, ipratropium-albuteroL, melatonin    Review of Systems   Constitutional:  Positive for fatigue. Negative for chills and fever.   Respiratory:  Positive for cough and shortness of breath.    Cardiovascular:  Negative for leg swelling.   Gastrointestinal:  Positive for nausea and vomiting. Negative for abdominal pain and diarrhea.   All other systems reviewed and are negative.       Objective  Range of Vitals (last 24 hours)  Heart Rate:  [72-85]   Temp:  [36.3 °C (97.4 °F)-37 °C (98.6 °F)]   Resp:  [15-28]   BP: (121-147)/(69-99)   Height:  [157.5 cm (5' 2\")]   Weight:  [90 kg (198 lb 6.6 oz)]   SpO2:  [88 %-98 %]   Daily Weight  11/26/24 : 90 kg (198 lb 6.6 oz)    Body mass index is 36.29 kg/m².     Physical Exam  Constitutional:       Appearance: Normal appearance.   HENT:      Head: Normocephalic and atraumatic.   Eyes:      Extraocular Movements: Extraocular movements intact.      Conjunctiva/sclera: Conjunctivae normal.   Cardiovascular:      Rate and Rhythm: Normal rate and regular rhythm.   Pulmonary:      Effort: Pulmonary effort is normal.      Breath sounds: Rhonchi present.   Abdominal:      General: Bowel sounds are normal.      Palpations: Abdomen is soft.      Tenderness: There is no abdominal tenderness.   Musculoskeletal:         General: Normal range of motion.      Cervical back: Normal range of motion and neck supple.   Skin:     General: Skin is warm and dry.   Neurological:      General: No focal deficit present.      Mental Status: She is alert and oriented to person, place, and time.   Psychiatric:         Mood and Affect: Mood normal.         Behavior: Behavior normal.          Relevant Results    Labs  Results from last 72 hours   Lab Units 11/27/24  0523 11/26/24  2118 11/26/24  1739   WBC AUTO x10*3/uL 7.9 13.1* 14.9*   HEMOGLOBIN g/dL 15.7 16.3* 17.4*   HEMATOCRIT " "% 47.1* 50.8* 53.5*   PLATELETS AUTO x10*3/uL 317 302 368   NEUTROS PCT AUTO %  --  90.6 77.3   LYMPHS PCT AUTO %  --  7.1 15.9   MONOS PCT AUTO %  --  1.5 5.7   EOS PCT AUTO %  --  0.1 0.6     Results from last 72 hours   Lab Units 11/27/24  0523 11/26/24  1638   SODIUM mmol/L 137 139   POTASSIUM mmol/L 4.2 4.0   CHLORIDE mmol/L 100 101   CO2 mmol/L 28 29   BUN mg/dL 12 13   CREATININE mg/dL 0.48* 0.64   GLUCOSE mg/dL 169* 182*   CALCIUM mg/dL 9.7 10.0   ANION GAP mmol/L 13 13   EGFR mL/min/1.73m*2 >90 >90     Results from last 72 hours   Lab Units 11/26/24  1638   ALK PHOS U/L 72   BILIRUBIN TOTAL mg/dL 0.6   PROTEIN TOTAL g/dL 7.7   ALT U/L 18   AST U/L 26   ALBUMIN g/dL 4.8     Estimated Creatinine Clearance: 125 mL/min (A) (by C-G formula based on SCr of 0.48 mg/dL (L)).  CRP   Date Value Ref Range Status   08/30/2023 1.27 (A) mg/dL Final     Comment:     REF VALUE  < 1.00     08/30/2023 CANCELED       Comment:     Result canceled by the ancillary.     Sedimentation Rate   Date Value Ref Range Status   08/30/2023 53 (H) 0 - 30 mm/h Final   08/30/2023 CANCELED       Comment:     Result canceled by the ancillary.     No results found for: \"HIV1X2\", \"HIVCONF\", \"IAPIAJ8RK\"  No results found for: \"HEPCABINIT\", \"HEPCAB\", \"HCVPCRQUANT\"  Microbiology  Blood cultures pending no growth  Imaging  ECG 12 lead    Result Date: 11/27/2024  Normal sinus rhythm Left bundle branch block Abnormal ECG When compared with ECG of 30-JUL-2024 15:36, No significant change was found    CT angio chest for pulmonary embolism    Result Date: 11/26/2024  Interpreted By:  Manohar Espana, STUDY: CT ANGIO CHEST FOR PULMONARY EMBOLISM;  11/26/2024 8:54 pm   INDICATION: Signs/Symptoms:pulmonary embolism.     COMPARISON: CT chest dated 09/23/2024.   ACCESSION NUMBER(S): [id number]   ORDERING CLINICIAN: ANA KAUFFMAN   TECHNIQUE: Helical data acquisition of the chest was obtained after intravenous administration of 75 mL Omnipaque " 350, as per PE protocol. Images were reformatted in coronal and sagittal planes. Axial and coronal maximum intensity projection (MIP) images were created and reviewed.   FINDINGS: POTENTIAL LIMITATIONS OF THE STUDY: Exam is somewhat degraded by beam hardening artifact from the contrast bolus in the left subclavian vein. Additionally, images are somewhat degraded by motion, limiting evaluation of the smaller subsegmental vessels in the lung bases.   HEART AND VESSELS: There are no discrete filling defects within main pulmonary artery and its branches to suggest acute pulmonary embolism. Main pulmonary artery and its branches are normal in caliber.   The thoracic aorta normal in course and caliber. Near non-opacification of thoracic aorta precludes assessment for acute aortic pathology. No coronary artery calcifications are seen. Please note, the study is not optimized for evaluation of coronary arteries.   The cardiac chambers are not enlarged.There are no findings to suggest right heart strain. There is no pericardial effusion seen.   MEDIASTINUM AND RELL, LOWER NECK AND AXILLA: The visualized thyroid gland is within normal limits. No evidence of thoracic lymphadenopathy by CT criteria. Esophagus appears within normal limits as seen.   LUNGS AND AIRWAYS: Mucus and secretions are present in the distal trachea and right mainstem bronchus, with some mucous plugging noted in the smaller airways supplying the right lower lobe   Exam was obtained during expiratory phase of respiration with low lung volumes and bronchovascular crowding. There are atelectatic changes in the lungs bilaterally, most pronounced in the lower lobes without evidence of new consolidation or pleural effusion.   A 1.2 cm nodular focus along the pleura in the right upper lobe is similar in appearance to prior study in September of 2024, possibly representing sequela of prior injury.   UPPER ABDOMEN: The visualized subdiaphragmatic structures  demonstrate no remarkable findings.   CHEST WALL AND OSSEOUS STRUCTURES: Chest wall is within normal limits. No acute osseous pathology.There are no suspicious osseous lesions.Mild multilevel degenerative changes are present in the thoracic spine without evidence of new compression fracture or high-grade stenosis.       1. No evidence of a large central pulmonary embolism, although evaluation of the smaller subsegmental vessels is somewhat limited by motion, especially in the lung bases. 2. Expiratory phase of respiration with low lung volumes and bronchovascular crowding. Mucus/secretions are present in the distal trachea in the right mainstem bronchus, with some mucous plugging noted in the smaller airways in the right lower lobe. There are atelectatic changes in the lungs bilaterally, without evidence of consolidation or sizable effusion. 3. A 1.2 cm nodular focus along the pleura in the right upper lobe is similar in appearance to prior study in September of 2024, and may represent sequela of prior trauma.   MACRO: None   Signed by: Manohar Espana 11/26/2024 10:13 PM Dictation workstation:   [id number]    XR chest 1 view    Result Date: 11/26/2024  Interpreted By:  Sean Saleem, STUDY: XR CHEST 1 VIEW;  11/26/2024 4:55 pm   INDICATION: Signs/Symptoms:wheezing.     COMPARISON: 07/30/2024   ACCESSION NUMBER(S): [id number]   ORDERING CLINICIAN: SU LOUIS   FINDINGS:         CARDIOMEDIASTINAL SILHOUETTE: Cardiomediastinal silhouette is normal in size and configuration. There is pulmonary vascular congestion.   LUNGS: There is no consolidation or edema   ABDOMEN: No remarkable upper abdominal findings.   BONES: No acute osseous changes.       Pulmonary vascular congestion. No evidence of acute cardiopulmonary process.     MACRO: None   Signed by: Sean Saleem 11/26/2024 5:07 PM Dictation workstation:   [id number]     Assessment/Plan   Acute hypoxic respiratory failure-on 5LNC  Coronavirus  infection  Allergic reaction-unknown cause  Resolved leukocytosis    Continue dexamethasone  Begin remdesivir-up to 5 days while inpatient  Monitor WBC and temperature  Oxygen as needed  Follow-up blood cultures  Supportive care    Total time spent caring for the patient today was 35 minutes.  This includes time spent before the visit reviewing the chart, time spent during the visit, and time spent after the visit on documentation.      Emili Bellamy, APRN-CNP

## 2024-11-28 LAB
C4 SERPL-MCNC: 44 MG/DL (ref 10–50)
GLUCOSE BLD MANUAL STRIP-MCNC: 140 MG/DL (ref 74–99)
GLUCOSE BLD MANUAL STRIP-MCNC: 187 MG/DL (ref 74–99)
GLUCOSE BLD MANUAL STRIP-MCNC: 192 MG/DL (ref 74–99)
GLUCOSE BLD MANUAL STRIP-MCNC: 267 MG/DL (ref 74–99)
HOLD SPECIMEN: NORMAL
HOLD SPECIMEN: NORMAL

## 2024-11-28 PROCEDURE — 2500000001 HC RX 250 WO HCPCS SELF ADMINISTERED DRUGS (ALT 637 FOR MEDICARE OP): Performed by: INTERNAL MEDICINE

## 2024-11-28 PROCEDURE — 1200000002 HC GENERAL ROOM WITH TELEMETRY DAILY

## 2024-11-28 PROCEDURE — 2500000001 HC RX 250 WO HCPCS SELF ADMINISTERED DRUGS (ALT 637 FOR MEDICARE OP): Performed by: STUDENT IN AN ORGANIZED HEALTH CARE EDUCATION/TRAINING PROGRAM

## 2024-11-28 PROCEDURE — 94640 AIRWAY INHALATION TREATMENT: CPT

## 2024-11-28 PROCEDURE — 94668 MNPJ CHEST WALL SBSQ: CPT

## 2024-11-28 PROCEDURE — 99232 SBSQ HOSP IP/OBS MODERATE 35: CPT | Performed by: INTERNAL MEDICINE

## 2024-11-28 PROCEDURE — 86160 COMPLEMENT ANTIGEN: CPT | Mod: WESLAB | Performed by: STUDENT IN AN ORGANIZED HEALTH CARE EDUCATION/TRAINING PROGRAM

## 2024-11-28 PROCEDURE — 82947 ASSAY GLUCOSE BLOOD QUANT: CPT

## 2024-11-28 PROCEDURE — 2500000002 HC RX 250 W HCPCS SELF ADMINISTERED DRUGS (ALT 637 FOR MEDICARE OP, ALT 636 FOR OP/ED): Performed by: INTERNAL MEDICINE

## 2024-11-28 PROCEDURE — 94664 DEMO&/EVAL PT USE INHALER: CPT

## 2024-11-28 PROCEDURE — 2500000002 HC RX 250 W HCPCS SELF ADMINISTERED DRUGS (ALT 637 FOR MEDICARE OP, ALT 636 FOR OP/ED): Performed by: STUDENT IN AN ORGANIZED HEALTH CARE EDUCATION/TRAINING PROGRAM

## 2024-11-28 PROCEDURE — 2500000004 HC RX 250 GENERAL PHARMACY W/ HCPCS (ALT 636 FOR OP/ED): Mod: JZ | Performed by: NURSE PRACTITIONER

## 2024-11-28 PROCEDURE — 36415 COLL VENOUS BLD VENIPUNCTURE: CPT | Performed by: STUDENT IN AN ORGANIZED HEALTH CARE EDUCATION/TRAINING PROGRAM

## 2024-11-28 PROCEDURE — 2500000004 HC RX 250 GENERAL PHARMACY W/ HCPCS (ALT 636 FOR OP/ED): Performed by: INTERNAL MEDICINE

## 2024-11-28 RX ORDER — IPRATROPIUM BROMIDE AND ALBUTEROL SULFATE 2.5; .5 MG/3ML; MG/3ML
3 SOLUTION RESPIRATORY (INHALATION)
Status: DISCONTINUED | OUTPATIENT
Start: 2024-11-28 | End: 2024-11-29 | Stop reason: HOSPADM

## 2024-11-28 RX ORDER — PREDNISONE 20 MG/1
20 TABLET ORAL 2 TIMES DAILY
Status: DISCONTINUED | OUTPATIENT
Start: 2024-11-28 | End: 2024-11-29 | Stop reason: HOSPADM

## 2024-11-28 ASSESSMENT — COGNITIVE AND FUNCTIONAL STATUS - GENERAL
DAILY ACTIVITIY SCORE: 24
MOBILITY SCORE: 24

## 2024-11-28 ASSESSMENT — PAIN SCALES - GENERAL: PAINLEVEL_OUTOF10: 0 - NO PAIN

## 2024-11-28 NOTE — CARE PLAN
The patient's goals for the shift include      The clinical goals for the shift include vitals will remain stable overnight      Problem: Pain - Adult  Goal: Verbalizes/displays adequate comfort level or baseline comfort level  Outcome: Progressing     Problem: Safety - Adult  Goal: Free from fall injury  Outcome: Progressing     Problem: Discharge Planning  Goal: Discharge to home or other facility with appropriate resources  Outcome: Progressing     Problem: Chronic Conditions and Co-morbidities  Goal: Patient's chronic conditions and co-morbidity symptoms are monitored and maintained or improved  Outcome: Progressing     Problem: Respiratory  Goal: Clear secretions with interventions this shift  Outcome: Progressing  Goal: Minimize anxiety/maximize coping throughout shift  Outcome: Progressing  Goal: Minimal/no exertional discomfort or dyspnea this shift  Outcome: Progressing  Goal: No signs of respiratory distress (eg. Use of accessory muscles. Peds grunting)  Outcome: Progressing  Goal: Patent airway maintained this shift  Outcome: Progressing  Goal: Tolerate mechanical ventilation evidenced by VS/agitation level this shift  Outcome: Progressing  Goal: Tolerate pulmonary toileting this shift  Outcome: Progressing  Goal: Verbalize decreased shortness of breath this shift  Outcome: Progressing  Goal: Wean oxygen to maintain O2 saturation per order/standard this shift  Outcome: Progressing  Goal: Increase self care and/or family involvement in next 24 hours  Outcome: Progressing

## 2024-11-28 NOTE — PROGRESS NOTES
"Ashley Kessler \"Ted\" is a 59 y.o. female on day 2 of admission presenting with Acute hypoxic respiratory failure (Multi).      Subjective   Feels better. Breathing has improved.  On 3 l/min with Psat 98%.   Asking about going home.    Objective     Last Recorded Vitals  /61 (BP Location: Right arm, Patient Position: Lying)   Pulse 71   Temp 36.9 °C (98.4 °F) (Oral)   Resp 18   Wt 90 kg (198 lb 6.6 oz)   SpO2 93%   Intake/Output last 3 Shifts:    Intake/Output Summary (Last 24 hours) at 11/28/2024 1259  Last data filed at 11/28/2024 1251  Gross per 24 hour   Intake 370 ml   Output --   Net 370 ml       Admission Weight  Weight: 90 kg (198 lb 6.6 oz) (11/26/24 1625)    Daily Weight  11/26/24 : 90 kg (198 lb 6.6 oz)    Image Results  ECG 12 lead  Normal sinus rhythm  Left bundle branch block  Abnormal ECG  When compared with ECG of 30-JUL-2024 15:36,  No significant change was found      Physical Exam  Looks much better.  Cooperative with exam.  On  2l/min. (I decreased O2 to 2 l/min). Psat 96%.  Still in a mild respiratory distress  A, Ox3.  Face is symmetrical.  Skin - no lesions.  Lungs: B/L rhonchi and wheezes, much less.  Heart: regular S1S2.  Abdomen: soft, NT, ND. BS positive.  Extr.: no edema, cords, cyanosis.  Moves all extr.   Relevant Results             Results for orders placed or performed during the hospital encounter of 11/26/24 (from the past 24 hours)   POCT GLUCOSE   Result Value Ref Range    POCT Glucose 195 (H) 74 - 99 mg/dL   POCT GLUCOSE   Result Value Ref Range    POCT Glucose 143 (H) 74 - 99 mg/dL   Lavender Top   Result Value Ref Range    Extra Tube Hold for add-ons.    PST Top   Result Value Ref Range    Extra Tube Hold for add-ons.    POCT GLUCOSE   Result Value Ref Range    POCT Glucose 140 (H) 74 - 99 mg/dL   POCT GLUCOSE   Result Value Ref Range    POCT Glucose 192 (H) 74 - 99 mg/dL    ECG 12 lead    Result Date: 11/27/2024  Normal sinus rhythm Left bundle branch block Abnormal " ECG When compared with ECG of 30-JUL-2024 15:36, No significant change was found    CT angio chest for pulmonary embolism    Result Date: 11/26/2024  Interpreted By:  Manohar Espana, STUDY: CT ANGIO CHEST FOR PULMONARY EMBOLISM;  11/26/2024 8:54 pm   INDICATION: Signs/Symptoms:pulmonary embolism.     COMPARISON: CT chest dated 09/23/2024.   ACCESSION NUMBER(S): [id number]   ORDERING CLINICIAN: ANA KAUFFMAN   TECHNIQUE: Helical data acquisition of the chest was obtained after intravenous administration of 75 mL Omnipaque 350, as per PE protocol. Images were reformatted in coronal and sagittal planes. Axial and coronal maximum intensity projection (MIP) images were created and reviewed.   FINDINGS: POTENTIAL LIMITATIONS OF THE STUDY: Exam is somewhat degraded by beam hardening artifact from the contrast bolus in the left subclavian vein. Additionally, images are somewhat degraded by motion, limiting evaluation of the smaller subsegmental vessels in the lung bases.   HEART AND VESSELS: There are no discrete filling defects within main pulmonary artery and its branches to suggest acute pulmonary embolism. Main pulmonary artery and its branches are normal in caliber.   The thoracic aorta normal in course and caliber. Near non-opacification of thoracic aorta precludes assessment for acute aortic pathology. No coronary artery calcifications are seen. Please note, the study is not optimized for evaluation of coronary arteries.   The cardiac chambers are not enlarged.There are no findings to suggest right heart strain. There is no pericardial effusion seen.   MEDIASTINUM AND RELL, LOWER NECK AND AXILLA: The visualized thyroid gland is within normal limits. No evidence of thoracic lymphadenopathy by CT criteria. Esophagus appears within normal limits as seen.   LUNGS AND AIRWAYS: Mucus and secretions are present in the distal trachea and right mainstem bronchus, with some mucous plugging noted in the smaller  airways supplying the right lower lobe   Exam was obtained during expiratory phase of respiration with low lung volumes and bronchovascular crowding. There are atelectatic changes in the lungs bilaterally, most pronounced in the lower lobes without evidence of new consolidation or pleural effusion.   A 1.2 cm nodular focus along the pleura in the right upper lobe is similar in appearance to prior study in September of 2024, possibly representing sequela of prior injury.   UPPER ABDOMEN: The visualized subdiaphragmatic structures demonstrate no remarkable findings.   CHEST WALL AND OSSEOUS STRUCTURES: Chest wall is within normal limits. No acute osseous pathology.There are no suspicious osseous lesions.Mild multilevel degenerative changes are present in the thoracic spine without evidence of new compression fracture or high-grade stenosis.       1. No evidence of a large central pulmonary embolism, although evaluation of the smaller subsegmental vessels is somewhat limited by motion, especially in the lung bases. 2. Expiratory phase of respiration with low lung volumes and bronchovascular crowding. Mucus/secretions are present in the distal trachea in the right mainstem bronchus, with some mucous plugging noted in the smaller airways in the right lower lobe. There are atelectatic changes in the lungs bilaterally, without evidence of consolidation or sizable effusion. 3. A 1.2 cm nodular focus along the pleura in the right upper lobe is similar in appearance to prior study in September of 2024, and may represent sequela of prior trauma.   MACRO: None   Signed by: Manohar Espana 11/26/2024 10:13 PM Dictation workstation:   [id number]    XR chest 1 view    Result Date: 11/26/2024  Interpreted By:  Sean Saleem, STUDY: XR CHEST 1 VIEW;  11/26/2024 4:55 pm   INDICATION: Signs/Symptoms:wheezing.     COMPARISON: 07/30/2024   ACCESSION NUMBER(S): [id number]   ORDERING CLINICIAN: SU LOUIS   FINDINGS:          CARDIOMEDIASTINAL SILHOUETTE: Cardiomediastinal silhouette is normal in size and configuration. There is pulmonary vascular congestion.   LUNGS: There is no consolidation or edema   ABDOMEN: No remarkable upper abdominal findings.   BONES: No acute osseous changes.       Pulmonary vascular congestion. No evidence of acute cardiopulmonary process.     MACRO: None   Signed by: Sean Rameys 11/26/2024 5:07 PM Dictation workstation:   [id number]    Assessment/Plan                  Assessment & Plan  Acute hypoxic respiratory failure (Multi)    COVID-19 with hypoxemia.  Will treat with steroids, consult ID to advise about remdesivir patient had symptoms for a week now.  COPD probable, exacerbation.  Will treat with systemic steroids, nebulizers.  Consult pulmonology.  As needed expectorants.  Acute respiratory failure with hypoxemia, likely due to combination of COVID and COPD exacerbation.  Will check CT angiogram to rule out pulmonary embolism  Tobacco smoking.  Nicotine patch  History of adjustment disorder with depression, continue medications  Allergic reaction to unknown agent, IV Pepcid and Benadryl, systemic steroids.  No evidence of respiratory compromise during exam.  No drooling, no stridor.  DVT prophylaxis.  Lovenox  CODE STATUS, discussed with patient.  Full code for now.          11/27/24:  Improving clinically. Still on 5 l/min. Cont. Steroids, aerosols.  Seen by ID - started Remdesevir.  CTA of the chest - no PE.  Leucocytosis, reactive - resolved  Change Pepcid to PO.     11/28/24:  Doing better.  On  2l/min. Continue weaning off O2 as tolerated. Hopefully can tolerate RA soon.  Discussed with pulmonology: change steroids to PO Prednisone.  If stable on RA tomorrow will DC.      Jewell Silva MD

## 2024-11-28 NOTE — PROGRESS NOTES
Ted Kessler is a 59 y.o. female on day 2 of admission presenting with Acute hypoxic respiratory failure (Multi).    Subjective   Interval History:   Afebrile, no chills  [place]  Nonproductive cough  No chest pain  No nausea vomiting or diarrhea        Review of Systems   All other systems reviewed and are negative.      Objective   Range of Vitals (last 24 hours)  Heart Rate:  [71-73]   Temp:  [36.5 °C (97.7 °F)-36.9 °C (98.4 °F)]   Resp:  [18]   BP: (117-139)/(56-61)   SpO2:  [93 %-100 %]   Daily Weight  11/26/24 : 90 kg (198 lb 6.6 oz)    Body mass index is 36.29 kg/m².    Physical Exam  Constitutional:       Appearance: Normal appearance.   HENT:      Head: Normocephalic and atraumatic.   Eyes:      Extraocular Movements: Extraocular movements intact.      Conjunctiva/sclera: Conjunctivae normal.   Cardiovascular:      Rate and Rhythm: Normal rate and regular rhythm.   Pulmonary:      Effort: Pulmonary effort is normal.      Breath sounds: Rhonchi present.   Abdominal:      General: Bowel sounds are normal.      Palpations: Abdomen is soft.      Tenderness: There is no abdominal tenderness.   Musculoskeletal:         General: Normal range of motion.      Cervical back: Normal range of motion and neck supple.   Skin:     General: Skin is warm and dry.   Neurological:      General: No focal deficit present.      Mental Status: She is alert and oriented to person, place, and time.   Psychiatric:         Mood and Affect: Mood normal.         Behavior: Behavior normal.     Antibiotics  doxycycline - 100 mg    Relevant Results  Labs  Results from last 72 hours   Lab Units 11/27/24  0523 11/26/24  2118 11/26/24  1739   WBC AUTO x10*3/uL 7.9 13.1* 14.9*   HEMOGLOBIN g/dL 15.7 16.3* 17.4*   HEMATOCRIT % 47.1* 50.8* 53.5*   PLATELETS AUTO x10*3/uL 317 302 368   NEUTROS PCT AUTO %  --  90.6 77.3   LYMPHS PCT AUTO %  --  7.1 15.9   MONOS PCT AUTO %  --  1.5 5.7   EOS PCT AUTO %  --  0.1 0.6     Results from last 72 hours    Lab Units 11/27/24  0523 11/26/24  1638   SODIUM mmol/L 137 139   POTASSIUM mmol/L 4.2 4.0   CHLORIDE mmol/L 100 101   CO2 mmol/L 28 29   BUN mg/dL 12 13   CREATININE mg/dL 0.48* 0.64   GLUCOSE mg/dL 169* 182*   CALCIUM mg/dL 9.7 10.0   ANION GAP mmol/L 13 13   EGFR mL/min/1.73m*2 >90 >90     Results from last 72 hours   Lab Units 11/26/24  1638   ALK PHOS U/L 72   BILIRUBIN TOTAL mg/dL 0.6   PROTEIN TOTAL g/dL 7.7   ALT U/L 18   AST U/L 26   ALBUMIN g/dL 4.8     Estimated Creatinine Clearance: 125 mL/min (A) (by C-G formula based on SCr of 0.48 mg/dL (L)).  CRP   Date Value Ref Range Status   08/30/2023 1.27 (A) mg/dL Final     Comment:     REF VALUE  < 1.00     08/30/2023 CANCELED       Comment:     Result canceled by the ancillary.     Microbiology  Reviewed  Imaging  ECG 12 lead    Result Date: 11/27/2024  Normal sinus rhythm Left bundle branch block Abnormal ECG When compared with ECG of 30-JUL-2024 15:36, No significant change was found    CT angio chest for pulmonary embolism    Result Date: 11/26/2024  Interpreted By:  Manohar Espana, STUDY: CT ANGIO CHEST FOR PULMONARY EMBOLISM;  11/26/2024 8:54 pm   INDICATION: Signs/Symptoms:pulmonary embolism.     COMPARISON: CT chest dated 09/23/2024.   ACCESSION NUMBER(S): [id number]   ORDERING CLINICIAN: ANA KAUFFMAN   TECHNIQUE: Helical data acquisition of the chest was obtained after intravenous administration of 75 mL Omnipaque 350, as per PE protocol. Images were reformatted in coronal and sagittal planes. Axial and coronal maximum intensity projection (MIP) images were created and reviewed.   FINDINGS: POTENTIAL LIMITATIONS OF THE STUDY: Exam is somewhat degraded by beam hardening artifact from the contrast bolus in the left subclavian vein. Additionally, images are somewhat degraded by motion, limiting evaluation of the smaller subsegmental vessels in the lung bases.   HEART AND VESSELS: There are no discrete filling defects within main  pulmonary artery and its branches to suggest acute pulmonary embolism. Main pulmonary artery and its branches are normal in caliber.   The thoracic aorta normal in course and caliber. Near non-opacification of thoracic aorta precludes assessment for acute aortic pathology. No coronary artery calcifications are seen. Please note, the study is not optimized for evaluation of coronary arteries.   The cardiac chambers are not enlarged.There are no findings to suggest right heart strain. There is no pericardial effusion seen.   MEDIASTINUM AND RELL, LOWER NECK AND AXILLA: The visualized thyroid gland is within normal limits. No evidence of thoracic lymphadenopathy by CT criteria. Esophagus appears within normal limits as seen.   LUNGS AND AIRWAYS: Mucus and secretions are present in the distal trachea and right mainstem bronchus, with some mucous plugging noted in the smaller airways supplying the right lower lobe   Exam was obtained during expiratory phase of respiration with low lung volumes and bronchovascular crowding. There are atelectatic changes in the lungs bilaterally, most pronounced in the lower lobes without evidence of new consolidation or pleural effusion.   A 1.2 cm nodular focus along the pleura in the right upper lobe is similar in appearance to prior study in September of 2024, possibly representing sequela of prior injury.   UPPER ABDOMEN: The visualized subdiaphragmatic structures demonstrate no remarkable findings.   CHEST WALL AND OSSEOUS STRUCTURES: Chest wall is within normal limits. No acute osseous pathology.There are no suspicious osseous lesions.Mild multilevel degenerative changes are present in the thoracic spine without evidence of new compression fracture or high-grade stenosis.       1. No evidence of a large central pulmonary embolism, although evaluation of the smaller subsegmental vessels is somewhat limited by motion, especially in the lung bases. 2. Expiratory phase of respiration  with low lung volumes and bronchovascular crowding. Mucus/secretions are present in the distal trachea in the right mainstem bronchus, with some mucous plugging noted in the smaller airways in the right lower lobe. There are atelectatic changes in the lungs bilaterally, without evidence of consolidation or sizable effusion. 3. A 1.2 cm nodular focus along the pleura in the right upper lobe is similar in appearance to prior study in September of 2024, and may represent sequela of prior trauma.   MACRO: None   Signed by: Manohar Espana 11/26/2024 10:13 PM Dictation workstation:   [id number]    XR chest 1 view    Result Date: 11/26/2024  Interpreted By:  Sean Saleem, STUDY: XR CHEST 1 VIEW;  11/26/2024 4:55 pm   INDICATION: Signs/Symptoms:wheezing.     COMPARISON: 07/30/2024   ACCESSION NUMBER(S): [id number]   ORDERING CLINICIAN: SU LOUIS   FINDINGS:         CARDIOMEDIASTINAL SILHOUETTE: Cardiomediastinal silhouette is normal in size and configuration. There is pulmonary vascular congestion.   LUNGS: There is no consolidation or edema   ABDOMEN: No remarkable upper abdominal findings.   BONES: No acute osseous changes.       Pulmonary vascular congestion. No evidence of acute cardiopulmonary process.     MACRO: None   Signed by: Sean Saleem 11/26/2024 5:07 PM Dictation workstation:   [id number]     Assessment/Plan   Acute hypoxic respiratory failure, resolved, on room air  Coronavirus infection  Resolved leukocytosis    Continue dexamethasone-total of 10 days  Continue remdesivir while inpatient-up to 5 days  Oxygen as needed  Discharge planning  Monitor temperature and WBC      Ankit Schwarz MD

## 2024-11-29 ENCOUNTER — APPOINTMENT (OUTPATIENT)
Dept: CARDIOLOGY | Facility: HOSPITAL | Age: 59
End: 2024-11-29
Payer: MEDICAID

## 2024-11-29 VITALS
HEIGHT: 62 IN | SYSTOLIC BLOOD PRESSURE: 142 MMHG | RESPIRATION RATE: 18 BRPM | OXYGEN SATURATION: 91 % | BODY MASS INDEX: 36.51 KG/M2 | TEMPERATURE: 98.1 F | DIASTOLIC BLOOD PRESSURE: 78 MMHG | WEIGHT: 198.41 LBS | HEART RATE: 65 BPM

## 2024-11-29 PROBLEM — T78.40XA ALLERGIC REACTION: Status: RESOLVED | Noted: 2024-11-29 | Resolved: 2024-11-29

## 2024-11-29 PROBLEM — J45.41 MODERATE PERSISTENT ASTHMA WITH ACUTE EXACERBATION (HHS-HCC): Status: RESOLVED | Noted: 2024-11-29 | Resolved: 2024-11-29

## 2024-11-29 PROBLEM — T78.40XA ALLERGIC REACTION: Status: ACTIVE | Noted: 2024-11-29

## 2024-11-29 PROBLEM — J96.01 ACUTE HYPOXIC RESPIRATORY FAILURE (MULTI): Status: RESOLVED | Noted: 2024-11-26 | Resolved: 2024-11-29

## 2024-11-29 PROBLEM — J45.41 MODERATE PERSISTENT ASTHMA WITH ACUTE EXACERBATION (HHS-HCC): Status: ACTIVE | Noted: 2024-11-29

## 2024-11-29 PROBLEM — U07.1 COVID-19: Status: ACTIVE | Noted: 2024-11-29

## 2024-11-29 LAB
AORTIC VALVE PEAK VELOCITY: 1.09 M/S
AV PEAK GRADIENT: 5 MMHG
AVA (PEAK VEL): 2.06 CM2
EJECTION FRACTION APICAL 4 CHAMBER: 57
EJECTION FRACTION: 58 %
GLUCOSE BLD MANUAL STRIP-MCNC: 153 MG/DL (ref 74–99)
GLUCOSE BLD MANUAL STRIP-MCNC: 162 MG/DL (ref 74–99)
LEFT VENTRICULAR OUTFLOW TRACT DIAMETER: 2 CM
RIGHT VENTRICLE PEAK SYSTOLIC PRESSURE: 27.5 MMHG

## 2024-11-29 PROCEDURE — 2500000001 HC RX 250 WO HCPCS SELF ADMINISTERED DRUGS (ALT 637 FOR MEDICARE OP): Performed by: STUDENT IN AN ORGANIZED HEALTH CARE EDUCATION/TRAINING PROGRAM

## 2024-11-29 PROCEDURE — 93308 TTE F-UP OR LMTD: CPT | Performed by: INTERNAL MEDICINE

## 2024-11-29 PROCEDURE — 99239 HOSP IP/OBS DSCHRG MGMT >30: CPT | Performed by: INTERNAL MEDICINE

## 2024-11-29 PROCEDURE — 93325 DOPPLER ECHO COLOR FLOW MAPG: CPT

## 2024-11-29 PROCEDURE — 82947 ASSAY GLUCOSE BLOOD QUANT: CPT

## 2024-11-29 PROCEDURE — 93321 DOPPLER ECHO F-UP/LMTD STD: CPT | Performed by: INTERNAL MEDICINE

## 2024-11-29 PROCEDURE — 9420000001 HC RT PATIENT EDUCATION 5 MIN

## 2024-11-29 PROCEDURE — 93325 DOPPLER ECHO COLOR FLOW MAPG: CPT | Performed by: INTERNAL MEDICINE

## 2024-11-29 PROCEDURE — 2500000004 HC RX 250 GENERAL PHARMACY W/ HCPCS (ALT 636 FOR OP/ED): Mod: JZ | Performed by: NURSE PRACTITIONER

## 2024-11-29 PROCEDURE — 94640 AIRWAY INHALATION TREATMENT: CPT

## 2024-11-29 PROCEDURE — 2500000002 HC RX 250 W HCPCS SELF ADMINISTERED DRUGS (ALT 637 FOR MEDICARE OP, ALT 636 FOR OP/ED): Performed by: INTERNAL MEDICINE

## 2024-11-29 PROCEDURE — 2500000001 HC RX 250 WO HCPCS SELF ADMINISTERED DRUGS (ALT 637 FOR MEDICARE OP): Performed by: INTERNAL MEDICINE

## 2024-11-29 PROCEDURE — 2500000004 HC RX 250 GENERAL PHARMACY W/ HCPCS (ALT 636 FOR OP/ED): Performed by: INTERNAL MEDICINE

## 2024-11-29 RX ORDER — BUDESONIDE AND FORMOTEROL FUMARATE DIHYDRATE 80; 4.5 UG/1; UG/1
2 AEROSOL RESPIRATORY (INHALATION)
Qty: 10.2 G | Refills: 11 | Status: SHIPPED | OUTPATIENT
Start: 2024-11-29 | End: 2024-12-29

## 2024-11-29 RX ORDER — DOXYCYCLINE 100 MG/1
100 CAPSULE ORAL 2 TIMES DAILY
Qty: 14 CAPSULE | Refills: 0 | Status: SHIPPED | OUTPATIENT
Start: 2024-11-29 | End: 2024-12-06

## 2024-11-29 RX ORDER — DIPHENHYDRAMINE HCL 25 MG
25 TABLET ORAL EVERY 6 HOURS PRN
Start: 2024-11-29

## 2024-11-29 RX ORDER — BUDESONIDE AND FORMOTEROL FUMARATE DIHYDRATE 80; 4.5 UG/1; UG/1
2 AEROSOL RESPIRATORY (INHALATION)
Qty: 10.2 G | Refills: 11 | Status: SHIPPED | OUTPATIENT
Start: 2024-11-29 | End: 2024-11-29

## 2024-11-29 RX ORDER — PREDNISONE 20 MG/1
TABLET ORAL
Qty: 11 TABLET | Refills: 0 | Status: SHIPPED | OUTPATIENT
Start: 2024-11-29 | End: 2024-12-08

## 2024-11-29 RX ORDER — PREDNISONE 20 MG/1
TABLET ORAL
Qty: 11 TABLET | Refills: 0 | Status: SHIPPED | OUTPATIENT
Start: 2024-11-29 | End: 2024-11-29

## 2024-11-29 RX ORDER — DIPHENHYDRAMINE HCL 25 MG
25 TABLET ORAL EVERY 6 HOURS PRN
Qty: 30 TABLET | Refills: 0 | Status: SHIPPED | OUTPATIENT
Start: 2024-11-29 | End: 2024-11-29

## 2024-11-29 RX ORDER — ALBUTEROL SULFATE 90 UG/1
2 INHALANT RESPIRATORY (INHALATION)
Qty: 8 G | Refills: 5 | Status: SHIPPED | OUTPATIENT
Start: 2024-11-29 | End: 2024-12-29

## 2024-11-29 RX ORDER — GUAIFENESIN 600 MG/1
600 TABLET, EXTENDED RELEASE ORAL 2 TIMES DAILY PRN
Start: 2024-11-29

## 2024-11-29 RX ORDER — DOXYCYCLINE 100 MG/1
100 CAPSULE ORAL 2 TIMES DAILY
Qty: 14 CAPSULE | Refills: 0 | Status: SHIPPED | OUTPATIENT
Start: 2024-11-29 | End: 2024-11-29

## 2024-11-29 RX ORDER — ALBUTEROL SULFATE 90 UG/1
2 INHALANT RESPIRATORY (INHALATION)
Qty: 8 G | Refills: 5 | Status: SHIPPED | OUTPATIENT
Start: 2024-11-29 | End: 2024-11-29

## 2024-11-29 RX ORDER — GUAIFENESIN 100 MG/5ML
200 SOLUTION ORAL EVERY 4 HOURS PRN
Start: 2024-11-29

## 2024-11-29 ASSESSMENT — COGNITIVE AND FUNCTIONAL STATUS - GENERAL
DAILY ACTIVITIY SCORE: 24
MOBILITY SCORE: 24

## 2024-11-29 ASSESSMENT — PAIN SCALES - GENERAL: PAINLEVEL_OUTOF10: 0 - NO PAIN

## 2024-11-29 NOTE — NURSING NOTE
Reviewed discharge with patient, patient aware of medication at pharmacy and follow up appointments IV removed patient left via W/C

## 2024-11-29 NOTE — NURSING NOTE
Assumed care of patient at this time, patient resting in bed with brake in place and call light in reach denies any needs

## 2024-11-29 NOTE — DISCHARGE SUMMARY
Discharge Diagnosis  Acute hypoxic respiratory failure (Multi) with hypoxemia  COVID-19  Acute asthma exacerbation  Tobacco smoking  Hypertension  Allergic reaction to unknown agent    Issues Requiring Follow-Up  Acute asthma exacerbation    Discharge Meds     Medication List      START taking these medications     albuterol 90 mcg/actuation inhaler; Commonly known as: Ventolin HFA;   Inhale 2 puffs 4 times a day.   diphenhydrAMINE 25 mg tablet; Commonly known as: Sominex; Take 1 tablet   (25 mg) by mouth every 6 hours if needed for allergies or itching.   doxycycline 100 mg capsule; Commonly known as: Vibramycin; Take 1   capsule (100 mg) by mouth 2 times a day for 7 days. Take with at least 8   ounces (large glass) of water, do not lie down for 30 minutes after   * guaiFENesin 600 mg 12 hr tablet; Commonly known as: Mucinex; Take 1   tablet (600 mg) by mouth 2 times a day as needed for cough. Do not crush,   chew, or split.   * guaiFENesin 100 mg/5 mL syrup; Commonly known as: Robitussin; Take 10   mL (200 mg) by mouth every 4 hours if needed for congestion.   metFORMIN  mg 24 hr tablet; Commonly known as: Glucophage-XR   predniSONE 20 mg tablet; Commonly known as: Deltasone; Take 1 tablet (20   mg) by mouth 2 times a day for 3 days, THEN 1 tablet (20 mg) once daily   for 3 days, THEN 0.5 tablets (10 mg) once daily for 3 days.; Start taking   on: November 29, 2024  * This list has 2 medication(s) that are the same as other medications   prescribed for you. Read the directions carefully, and ask your doctor or   other care provider to review them with you.     CONTINUE taking these medications     ARIPiprazole 5 mg tablet; Commonly known as: Abilify   aspirin 81 mg EC tablet   atorvastatin 80 mg tablet; Commonly known as: Lipitor   cyclobenzaprine 10 mg tablet; Commonly known as: Flexeril; Take 1 tablet   (10 mg) by mouth 3 times a day as needed for muscle spasms.   DULoxetine 60 mg DR capsule; Commonly known  "as: Cymbalta   gabapentin 300 mg capsule; Commonly known as: Neurontin; Take 1 capsule   (300 mg) by mouth 3 times a day.   Jardiance 10 mg; Generic drug: empagliflozin   propranolol LA 60 mg 24 hr capsule; Commonly known as: Inderal LA     STOP taking these medications     losartan 25 mg tablet; Commonly known as: Cozaar       Test Results Pending At Discharge  Pending Labs       Order Current Status    Blood Culture Preliminary result    Blood Culture Preliminary result            Hospital Course   History:    Ted Kessler is a 59 y.o. female presenting with shortness of breath.  Patient has a long history of smoking since she was 30 years ago with interruption for 5 years.  She tells me she is currently she is down to smoking 3 cigarettes a day.  She does not have oxygen at home.  During her last admission in July she was seen by pulmonologist and she follows with Dr. Mohan as outpatient.  She tells me that Dr. Mohan told the patient that she does not have COPD.  Patient uses albuterol and Breo at home \"when she remembers\".  Patient was having some upper respiratory symptoms including nonproductive cough and chest congestion for about a week.  This morning, she took her friend to the doctor's office and then they went to eat to [employer].  About 20 minutes later, patient started feeling that her face was flushed she also noticed hives on her skin.  Then she became short of breath, and had difficulties breathing.  She was driving on the highway so she pulled off and call 911.  Patient was brought to the hospital where she was found to be hypoxic requiring 6 L of oxygen currently.  She tested positive for COVID.  Patient denies chest pain but admits to chest congestion..  Denies lower extremity edema.    CT angiogram of the chest:1. No evidence of a large central pulmonary embolism, although  evaluation of the smaller subsegmental vessels is somewhat limited by  motion, especially in the lung bases.  2. " Expiratory phase of respiration with low lung volumes and  bronchovascular crowding. Mucus/secretions are present in the distal  trachea in the right mainstem bronchus, with some mucous plugging  noted in the smaller airways in the right lower lobe. There are  atelectatic changes in the lungs bilaterally, without evidence of  consolidation or sizable effusion.  3. A 1.2 cm nodular focus along the pleura in the right upper lobe is  similar in appearance to prior study in September of 2024, and may  represent sequela of prior trauma.    Cardiogram was performed, results are pending.    Patient was admitted to the hospital.  She was treated with systemic steroids, aerosols.  Was evaluated by infectious disease and started on remdesivir.  Was evaluated by pulmonologist whose impression was that patient has acute asthma exacerbation provoked by COVID and possible allergic reaction.  There is no note from the emergency department that patient received epinephrine by paramedics.  Even though she had losartan was listed among her home medications and we felt that patient might have an episode of angioedema due to ARB's, patient tells me that she has not been taking losartan for a long time.  Initially, patient required 6 L of oxygen, he oxygenation improved and today she is stable on room air with ambulation.  Patient admits to significant improvement in her respiratory symptoms.  Patient will be discharged home on oral steroids, doxycycline and inhalers.  Patient was instructed to stay in isolation for 20 days total since she was diagnosed with COVID which will be December fifteenths.  Vies to follow-up with pulmonologist and allergist as outpatient.    Total time spent with patient today including exam, discussion, paperwork 36 minutes.  Outpatient Follow-Up  No future appointments.    Primary care, please follow on results of echocardiogram,   pulmonologist,   allergist  Jewell Silva MD

## 2024-11-30 NOTE — TREATMENT PLAN
Patient not seen or evaluated today   Recommend starting maintenance LABA/ICS on discharge with albuterol PRN  Doxycyline 100 mg BID for 3 more days  Prednisone taper   Encourage smoking, vaping, substance abuse cessation   FU with pulmonary outpatient, will need follow up imaging

## 2024-12-01 LAB
ATRIAL RATE: 71 BPM
BACTERIA BLD CULT: NORMAL
BACTERIA BLD CULT: NORMAL
P AXIS: 24 DEGREES
P OFFSET: 189 MS
P ONSET: 138 MS
PR INTERVAL: 148 MS
Q ONSET: 212 MS
QRS COUNT: 12 BEATS
QRS DURATION: 140 MS
QT INTERVAL: 448 MS
QTC CALCULATION(BAZETT): 486 MS
QTC FREDERICIA: 474 MS
R AXIS: 24 DEGREES
T AXIS: 76 DEGREES
T OFFSET: 436 MS
VENTRICULAR RATE: 71 BPM

## 2025-01-02 ENCOUNTER — HOSPITAL ENCOUNTER (EMERGENCY)
Facility: HOSPITAL | Age: 60
Discharge: HOME | End: 2025-01-02
Payer: MEDICAID

## 2025-01-02 ENCOUNTER — APPOINTMENT (OUTPATIENT)
Dept: RADIOLOGY | Facility: HOSPITAL | Age: 60
End: 2025-01-02
Payer: MEDICAID

## 2025-01-02 VITALS
OXYGEN SATURATION: 93 % | RESPIRATION RATE: 18 BRPM | HEIGHT: 60 IN | WEIGHT: 180 LBS | TEMPERATURE: 98.2 F | BODY MASS INDEX: 35.34 KG/M2 | DIASTOLIC BLOOD PRESSURE: 82 MMHG | HEART RATE: 74 BPM | SYSTOLIC BLOOD PRESSURE: 145 MMHG
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
## 2025-01-02 DIAGNOSIS — M25.532 LEFT WRIST PAIN: ICD-10-CM

## 2025-01-02 PROCEDURE — 73110 X-RAY EXAM OF WRIST: CPT | Mod: LT

## 2025-01-02 PROCEDURE — 73110 X-RAY EXAM OF WRIST: CPT | Mod: LEFT SIDE | Performed by: RADIOLOGY

## 2025-01-02 PROCEDURE — 73130 X-RAY EXAM OF HAND: CPT | Mod: LEFT SIDE | Performed by: RADIOLOGY

## 2025-01-02 PROCEDURE — 73130 X-RAY EXAM OF HAND: CPT | Mod: LT

## 2025-01-02 PROCEDURE — 2500000001 HC RX 250 WO HCPCS SELF ADMINISTERED DRUGS (ALT 637 FOR MEDICARE OP)

## 2025-01-02 PROCEDURE — 99284 EMERGENCY DEPT VISIT MOD MDM: CPT

## 2025-01-02 RX ORDER — OXYCODONE AND ACETAMINOPHEN 5; 325 MG/1; MG/1
1 TABLET ORAL ONCE
Status: COMPLETED | OUTPATIENT
Start: 2025-01-02 | End: 2025-01-02

## 2025-01-02 RX ADMIN — OXYCODONE HYDROCHLORIDE AND ACETAMINOPHEN 1 TABLET: 5; 325 TABLET ORAL at 11:31

## 2025-01-02 ASSESSMENT — PAIN DESCRIPTION - LOCATION
LOCATION: WRIST
LOCATION: ABDOMEN

## 2025-01-02 ASSESSMENT — COLUMBIA-SUICIDE SEVERITY RATING SCALE - C-SSRS
6. HAVE YOU EVER DONE ANYTHING, STARTED TO DO ANYTHING, OR PREPARED TO DO ANYTHING TO END YOUR LIFE?: NO
2. HAVE YOU ACTUALLY HAD ANY THOUGHTS OF KILLING YOURSELF?: NO
2. HAVE YOU ACTUALLY HAD ANY THOUGHTS OF KILLING YOURSELF?: NO
1. IN THE PAST MONTH, HAVE YOU WISHED YOU WERE DEAD OR WISHED YOU COULD GO TO SLEEP AND NOT WAKE UP?: NO
1. IN THE PAST MONTH, HAVE YOU WISHED YOU WERE DEAD OR WISHED YOU COULD GO TO SLEEP AND NOT WAKE UP?: NO
6. HAVE YOU EVER DONE ANYTHING, STARTED TO DO ANYTHING, OR PREPARED TO DO ANYTHING TO END YOUR LIFE?: NO

## 2025-01-02 ASSESSMENT — LIFESTYLE VARIABLES
EVER HAD A DRINK FIRST THING IN THE MORNING TO STEADY YOUR NERVES TO GET RID OF A HANGOVER: NO
TOTAL SCORE: 0
HAVE YOU EVER FELT YOU SHOULD CUT DOWN ON YOUR DRINKING: NO
EVER FELT BAD OR GUILTY ABOUT YOUR DRINKING: NO
HAVE PEOPLE ANNOYED YOU BY CRITICIZING YOUR DRINKING: NO

## 2025-01-02 ASSESSMENT — PAIN - FUNCTIONAL ASSESSMENT: PAIN_FUNCTIONAL_ASSESSMENT: 0-10

## 2025-01-02 ASSESSMENT — PAIN SCALES - GENERAL
PAINLEVEL_OUTOF10: 10 - WORST POSSIBLE PAIN
PAINLEVEL_OUTOF10: 7
PAINLEVEL_OUTOF10: 7

## 2025-01-02 ASSESSMENT — PAIN DESCRIPTION - ORIENTATION: ORIENTATION: LEFT

## 2025-01-02 ASSESSMENT — PAIN DESCRIPTION - PAIN TYPE: TYPE: ACUTE PAIN

## 2025-01-02 NOTE — DISCHARGE INSTRUCTIONS
Please rest, ice and keep compression with Ace wrap on the left wrist for pain relief and use Tylenol and Motrin for pain relief at home.  You may follow-up with the orthopedic hand surgeon within 1 week for further evaluation of symptoms.    It is important to remember that your care does not end here and you must continue to monitor your condition closely. Please return to the emergency department for any worsening or concerning signs or symptoms as directed by our conversations and the discharge instructions. If you do not have a doctor please contact the referral number on your discharge instructions. Please contact any physician specialists provided in your discharge notes as it is very important to follow up with them regarding your condition. If you are unable to reach the physicians provided, please come back to the Emergency Department at any time.

## 2025-01-02 NOTE — ED PROVIDER NOTES
HPI   Chief Complaint   Patient presents with    Fall     Fell 2 days ago and complaining of left hand pain. Left pain is swollen and red.        HPI  Patient is a 59-year-old female presenting for evaluation of a fall that occurred 2 days ago.  Patient states that her dog's leash got wrapped around her ankle and pulled her down forward.  She states she did fall with her left arm extended.  She states she did not hit her head or lose consciousness.  She does not use blood thinning medication.  She states that over the past few days she has had increasing pain in her left wrist and is concerned she may have broke it.  She denies chest pain or shortness of breath.  Denies recent illness.  Otherwise has no acute complaints.      Patient History   No past medical history on file.  Past Surgical History:   Procedure Laterality Date    MR HEAD ANGIO WO IV CONTRAST  1/3/2023    MR HEAD ANGIO WO IV CONTRAST 1/3/2023 [facility]    MR NECK ANGIO WO IV CONTRAST  1/3/2023    MR NECK ANGIO WO IV CONTRAST 1/3/2023 [facility]     Family History   Problem Relation Name Age of Onset    Breast cancer Father's Sister      Breast cancer Mother's Sister       Social History     Tobacco Use    Smoking status: Former     Current packs/day: 0.50     Types: Cigarettes    Smokeless tobacco: Never   Substance Use Topics    Alcohol use: Not Currently    Drug use: Not Currently       Physical Exam   ED Triage Vitals [01/02/25 1103]   Temperature Heart Rate Respirations BP   36.8 °C (98.2 °F) 74 18 145/82      Pulse Ox Temp Source Heart Rate Source Patient Position   (!) 93 % Temporal Monitor Sitting      BP Location FiO2 (%)     Right arm --       Physical Exam  Vitals and nursing note reviewed.   Constitutional:       General: She is not in acute distress.     Appearance: She is well-developed.   HENT:      Head: Normocephalic and atraumatic.   Eyes:      Conjunctiva/sclera: Conjunctivae normal.   Cardiovascular:      Rate  and Rhythm: Normal rate and regular rhythm.      Heart sounds: No murmur heard.  Pulmonary:      Effort: Pulmonary effort is normal. No respiratory distress.      Breath sounds: Normal breath sounds.   Abdominal:      Palpations: Abdomen is soft.      Tenderness: There is no abdominal tenderness.   Musculoskeletal:      Cervical back: Neck supple.      Comments: mild swelling of the left wrist, radial pulses +2, capillary refill less than 2 seconds   Skin:     General: Skin is warm and dry.      Capillary Refill: Capillary refill takes less than 2 seconds.   Neurological:      Mental Status: She is alert.   Psychiatric:         Mood and Affect: Mood normal.           ED Course & MDM   ED Course as of 01/03/25 2259   Thu Jan 02, 2025   1323 Personally reassessed the patient's heart rate and pulse oximetry which is 95% on room air and 75 bpm.  Ace wrap applied patient neurovascularly intact after this is performed.  Icing the extremity.  Stable for discharge. [JJ]      ED Course User Index  [JJ] Dorothy Smith PA-C         Diagnoses as of 01/03/25 2259   Fall, initial encounter   Left wrist pain                 No data recorded     Hutto Coma Scale Score: 15 (01/02/25 1123 : Elsa May RN)                           Medical Decision Making  Parts of this chart have been completed using voice recognition software. Please excuse any errors of transcription.  My thought process and reason for plan has been formulated from the time that I saw the patient until the time of disposition and is not specific to one specific moment during their visit and furthermore my MDM encompasses this entire chart and not only this text box.      HPI: Detailed above.    Exam: A medically appropriate exam performed, outlined above, given the known history and presentation.    History obtained from: Patient    Medications given during visit:  Medications   oxyCODONE-acetaminophen (Percocet) 5-325 mg per tablet 1 tablet (1 tablet oral  Given 1/2/25 1136)        Diagnostic/tests  Labs Reviewed - No data to display   XR wrist left 3+ views   Final Result   1. No acute fracture or malalignment.   2. Soft tissue findings as described above which can be associated   with contusion or tendon injury.        MACRO:   None.        Signed by: Elena Bass 1/2/2025 12:13 PM   Dictation workstation:   [id number]      XR hand left 3+ views   Final Result   1. No acute fracture or malalignment.   2. Soft tissue findings as described above which can be associated   with contusion or tendon injury.        MACRO:   None.        Signed by: Elena Bass 1/2/2025 12:13 PM   Dictation workstation:   [id number]           Considerations/further MDM:  Patient is a 59-year-old female presenting for evaluation of fall, wrist injury    Differential diagnosis associated with the patient presentation includes: Fracture versus dislocation versus sprain    Patient is well-appearing in no apparent distress during the visit.  She has no evidence of airway compromise or respiratory distress on exam.  She is being evaluated for a mechanical fall with right wrist injury.  She otherwise denies injury or trauma.  X-ray of the left wrist is performed without evidence of acute fracture or dislocation but with evidence of soft tissue edema which may be related to contusion versus tendon injury.  Patient's extremity was wrapped with Ace wrap.  She was instructed to continue with compression, elevation, Tylenol and Motrin for pain relief and to follow-up with her primary care provider for resolution of symptoms.  She states her understanding and is agreeable.  She is released in good condition.      Procedure  Procedures     Dorothy Smith PA-C  01/03/25 0199
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
## 2025-01-15 ENCOUNTER — OFFICE VISIT (OUTPATIENT)
Dept: PULMONOLOGY | Facility: CLINIC | Age: 60
End: 2025-01-15
Payer: MEDICAID

## 2025-01-15 VITALS
OXYGEN SATURATION: 98 % | HEART RATE: 69 BPM | HEIGHT: 59 IN | DIASTOLIC BLOOD PRESSURE: 81 MMHG | SYSTOLIC BLOOD PRESSURE: 133 MMHG | BODY MASS INDEX: 35.28 KG/M2 | WEIGHT: 175 LBS

## 2025-01-15 DIAGNOSIS — J45.20 MILD INTERMITTENT ASTHMA WITHOUT COMPLICATION (HHS-HCC): Primary | ICD-10-CM

## 2025-01-15 DIAGNOSIS — F17.210 NICOTINE DEPENDENCE, CIGARETTES, UNCOMPLICATED: ICD-10-CM

## 2025-01-15 DIAGNOSIS — J31.0 CHRONIC RHINITIS: ICD-10-CM

## 2025-01-15 DIAGNOSIS — R29.818 SUSPECTED SLEEP APNEA: ICD-10-CM

## 2025-01-15 DIAGNOSIS — T78.3XXS ANGIOEDEMA, SEQUELA: ICD-10-CM

## 2025-01-15 PROBLEM — T78.3XXA ANGIO-EDEMA: Status: ACTIVE | Noted: 2025-01-15

## 2025-01-15 PROCEDURE — 3079F DIAST BP 80-89 MM HG: CPT | Performed by: NURSE PRACTITIONER

## 2025-01-15 PROCEDURE — 3075F SYST BP GE 130 - 139MM HG: CPT | Performed by: NURSE PRACTITIONER

## 2025-01-15 PROCEDURE — 99204 OFFICE O/P NEW MOD 45 MIN: CPT | Performed by: NURSE PRACTITIONER

## 2025-01-15 PROCEDURE — 3008F BODY MASS INDEX DOCD: CPT | Performed by: NURSE PRACTITIONER

## 2025-01-15 PROCEDURE — 99214 OFFICE O/P EST MOD 30 MIN: CPT | Performed by: NURSE PRACTITIONER

## 2025-01-15 RX ORDER — DULOXETIN HYDROCHLORIDE 30 MG/1
30 CAPSULE, DELAYED RELEASE ORAL DAILY
COMMUNITY
Start: 2024-12-31

## 2025-01-15 RX ORDER — FLUTICASONE FUROATE AND VILANTEROL 100; 25 UG/1; UG/1
1 POWDER RESPIRATORY (INHALATION) DAILY
COMMUNITY

## 2025-01-15 RX ORDER — ALBUTEROL SULFATE 90 UG/1
2 INHALANT RESPIRATORY (INHALATION) EVERY 4 HOURS PRN
Qty: 8 G | Refills: 5 | Status: SHIPPED | OUTPATIENT
Start: 2025-01-15 | End: 2025-02-14

## 2025-01-15 RX ORDER — CETIRIZINE HYDROCHLORIDE 10 MG/1
10 TABLET ORAL DAILY
Qty: 30 TABLET | Refills: 3 | Status: SHIPPED | OUTPATIENT
Start: 2025-01-15

## 2025-01-15 RX ORDER — ALBUTEROL SULFATE 90 UG/1
2 INHALANT RESPIRATORY (INHALATION)
Qty: 8 G | Refills: 5 | Status: SHIPPED | OUTPATIENT
Start: 2025-01-15 | End: 2025-01-15

## 2025-01-15 RX ORDER — ARIPIPRAZOLE 10 MG/1
10 TABLET ORAL DAILY
COMMUNITY
Start: 2024-12-31

## 2025-01-15 ASSESSMENT — ENCOUNTER SYMPTOMS
VOICE CHANGE: 0
PALPITATIONS: 0
VOMITING: 0
SINUS PRESSURE: 0
TREMORS: 0
FEVER: 0
JOINT SWELLING: 0
EYE ITCHING: 0
WHEEZING: 0
TROUBLE SWALLOWING: 0
BACK PAIN: 1
MYALGIAS: 1
WEAKNESS: 0
NAUSEA: 0
APPETITE CHANGE: 0
AGITATION: 0
BRUISES/BLEEDS EASILY: 0
ARTHRALGIAS: 1
CHEST TIGHTNESS: 0
RHINORRHEA: 1
EYE REDNESS: 0
HEADACHES: 0
SHORTNESS OF BREATH: 0
UNEXPECTED WEIGHT CHANGE: 0
CHILLS: 0
FATIGUE: 1
ABDOMINAL PAIN: 0
SORE THROAT: 0
NERVOUS/ANXIOUS: 1
ROS GI COMMENTS: DENIES ACID REFLUX
COUGH: 0
DIZZINESS: 0
DIARRHEA: 0
SLEEP DISTURBANCE: 1
STRIDOR: 0
SINUS PAIN: 0
ACTIVITY CHANGE: 0

## 2025-01-15 ASSESSMENT — LIFESTYLE VARIABLES
SKIP TO QUESTIONS 9-10: 1
HOW OFTEN DO YOU HAVE A DRINK CONTAINING ALCOHOL: MONTHLY OR LESS
AUDIT-C TOTAL SCORE: 1
HOW OFTEN DO YOU HAVE SIX OR MORE DRINKS ON ONE OCCASION: NEVER
HOW MANY STANDARD DRINKS CONTAINING ALCOHOL DO YOU HAVE ON A TYPICAL DAY: 1 OR 2

## 2025-01-15 ASSESSMENT — PAIN SCALES - GENERAL: PAINLEVEL_OUTOF10: 0-NO PAIN

## 2025-01-15 NOTE — PATIENT INSTRUCTIONS
"Today we discussed your pulmonary history, symptoms and plan moving forward.    -Continue Albuterol Inhaler; 2 puffs every 4-6 hours as needed for shortness of breath. You can also take this 10-15 minutes prior to exertional activity to help \"prime\" your lungs.  -Monitor the frequency of how often you are needing your albuterol.  If you find yourself needing this most days of the week/multiple times a day; please give me a call.  At that point in time we will likely get you restarted on your former Breo 100 inhaler  -Start cetirizine 10 mg daily in the evening.  This will help with your chronic runny nose  -Referral to allergist due to the event you had leading up to your hospitalization with the tongue/mouth swelling  -Referral to sleep medicine for suspected sleep apnea    Thank you for visiting the pulmonary clinic today! It was a pleasure to participate in your care.  Please return to clinic 3 months or sooner if needed.    Joao Butler, CNP  My Office Number: [phone]   CT Scheduling: [phone]  PFT/Follow Up Visit Scheduling: [phone]  My Nurse: MEGGAN Weber    To reach the nurse, Tia Wolfe RN, please call ([phone]) Tia has a secure voice mail account if you want to leave a message.    **For immediate needs such as medication issues/refills, active sick symptoms/medical concerns; I ask that you please call the office and speak to the pulmonary nurse. MyChart messages do not come directly to me. There can sometimes be a delay before I am aware of any messages that were sent. Thank you.**      "

## 2025-02-19 ENCOUNTER — APPOINTMENT (OUTPATIENT)
Dept: SLEEP MEDICINE | Facility: CLINIC | Age: 60
End: 2025-02-19
Payer: MEDICAID

## 2025-02-20 ENCOUNTER — OFFICE VISIT (OUTPATIENT)
Dept: ORTHOPEDIC SURGERY | Facility: CLINIC | Age: 60
End: 2025-02-20
Payer: MEDICAID

## 2025-02-20 ENCOUNTER — HOSPITAL ENCOUNTER (OUTPATIENT)
Dept: RADIOLOGY | Facility: CLINIC | Age: 60
Discharge: HOME | End: 2025-02-20
Payer: MEDICAID

## 2025-02-20 VITALS — HEIGHT: 59 IN | BODY MASS INDEX: 36.29 KG/M2 | WEIGHT: 180 LBS

## 2025-02-20 DIAGNOSIS — M47.817 OSTEOARTHRITIS OF LUMBOSACRAL SPINE: ICD-10-CM

## 2025-02-20 DIAGNOSIS — Z98.890 HISTORY OF OPEN REDUCTION AND INTERNAL FIXATION (ORIF) PROCEDURE: ICD-10-CM

## 2025-02-20 DIAGNOSIS — M54.9 BACK PAIN DUE TO INJURY: Primary | ICD-10-CM

## 2025-02-20 DIAGNOSIS — R52 PAIN: ICD-10-CM

## 2025-02-20 PROCEDURE — 99203 OFFICE O/P NEW LOW 30 MIN: CPT | Performed by: ORTHOPAEDIC SURGERY

## 2025-02-20 PROCEDURE — 99213 OFFICE O/P EST LOW 20 MIN: CPT | Performed by: ORTHOPAEDIC SURGERY

## 2025-02-20 PROCEDURE — 72100 X-RAY EXAM L-S SPINE 2/3 VWS: CPT

## 2025-02-20 PROCEDURE — 3008F BODY MASS INDEX DOCD: CPT | Performed by: ORTHOPAEDIC SURGERY

## 2025-02-20 ASSESSMENT — ENCOUNTER SYMPTOMS
DEPRESSION: 0
OCCASIONAL FEELINGS OF UNSTEADINESS: 0
LOSS OF SENSATION IN FEET: 0

## 2025-02-20 ASSESSMENT — PAIN SCALES - GENERAL
PAINLEVEL_OUTOF10: 1
PAINLEVEL_OUTOF10: 1

## 2025-02-20 ASSESSMENT — LIFESTYLE VARIABLES
AUDIT-C TOTAL SCORE: 1
HOW OFTEN DURING THE LAST YEAR HAVE YOU HAD A FEELING OF GUILT OR REMORSE AFTER DRINKING: NEVER
HOW OFTEN DO YOU HAVE SIX OR MORE DRINKS ON ONE OCCASION: NEVER
HOW OFTEN DURING THE LAST YEAR HAVE YOU NEEDED AN ALCOHOLIC DRINK FIRST THING IN THE MORNING TO GET YOURSELF GOING AFTER A NIGHT OF HEAVY DRINKING: NEVER
HOW OFTEN DURING THE LAST YEAR HAVE YOU FAILED TO DO WHAT WAS NORMALLY EXPECTED FROM YOU BECAUSE OF DRINKING: NEVER
HOW OFTEN DO YOU HAVE A DRINK CONTAINING ALCOHOL: MONTHLY OR LESS
HAVE YOU OR SOMEONE ELSE BEEN INJURED AS A RESULT OF YOUR DRINKING: NO
HOW OFTEN DURING THE LAST YEAR HAVE YOU BEEN UNABLE TO REMEMBER WHAT HAPPENED THE NIGHT BEFORE BECAUSE YOU HAD BEEN DRINKING: NEVER
HOW MANY STANDARD DRINKS CONTAINING ALCOHOL DO YOU HAVE ON A TYPICAL DAY: PATIENT DOES NOT DRINK
HOW OFTEN DURING THE LAST YEAR HAVE YOU FOUND THAT YOU WERE NOT ABLE TO STOP DRINKING ONCE YOU HAD STARTED: NEVER
AUDIT TOTAL SCORE: 1
SKIP TO QUESTIONS 9-10: 1
HAS A RELATIVE, FRIEND, DOCTOR, OR ANOTHER HEALTH PROFESSIONAL EXPRESSED CONCERN ABOUT YOUR DRINKING OR SUGGESTED YOU CUT DOWN: NO

## 2025-02-20 ASSESSMENT — PAIN DESCRIPTION - DESCRIPTORS: DESCRIPTORS: SHOOTING;RADIATING

## 2025-02-20 ASSESSMENT — PATIENT HEALTH QUESTIONNAIRE - PHQ9
SUM OF ALL RESPONSES TO PHQ9 QUESTIONS 1 AND 2: 0
1. LITTLE INTEREST OR PLEASURE IN DOING THINGS: NOT AT ALL
2. FEELING DOWN, DEPRESSED OR HOPELESS: NOT AT ALL

## 2025-02-20 ASSESSMENT — PAIN - FUNCTIONAL ASSESSMENT: PAIN_FUNCTIONAL_ASSESSMENT: 0-10
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
## 2025-02-20 NOTE — PATIENT INSTRUCTIONS
Thank you for coming to see us today!     We are going to give you a referral for Pain managment and physical therapy  Please call central scheduling to make this appointment.   Continue to modify your activities as pain allows     Please follow up with us as needed

## 2025-02-26 ENCOUNTER — TELEPHONE (OUTPATIENT)
Dept: ORTHOPEDIC SURGERY | Facility: CLINIC | Age: 60
End: 2025-02-26
Payer: MEDICAID
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
## 2025-03-04 ENCOUNTER — HOSPITAL ENCOUNTER (OUTPATIENT)
Dept: RADIOLOGY | Facility: CLINIC | Age: 60
Discharge: HOME | End: 2025-03-04
Payer: MEDICAID

## 2025-03-04 ENCOUNTER — OFFICE VISIT (OUTPATIENT)
Dept: ORTHOPEDIC SURGERY | Facility: CLINIC | Age: 60
End: 2025-03-04
Payer: MEDICAID

## 2025-03-04 VITALS — BODY MASS INDEX: 37.79 KG/M2 | HEIGHT: 58 IN | WEIGHT: 180 LBS

## 2025-03-04 DIAGNOSIS — S43.401A SPRAIN OF RIGHT SHOULDER, UNSPECIFIED SHOULDER SPRAIN TYPE, INITIAL ENCOUNTER: ICD-10-CM

## 2025-03-04 DIAGNOSIS — M25.511 RIGHT SHOULDER PAIN, UNSPECIFIED CHRONICITY: ICD-10-CM

## 2025-03-04 DIAGNOSIS — M19.012 PRIMARY OSTEOARTHRITIS, LEFT SHOULDER: ICD-10-CM

## 2025-03-04 DIAGNOSIS — S43.402A SPRAIN OF LEFT SHOULDER, UNSPECIFIED SHOULDER SPRAIN TYPE, INITIAL ENCOUNTER: ICD-10-CM

## 2025-03-04 DIAGNOSIS — M25.511 RIGHT SHOULDER PAIN, UNSPECIFIED CHRONICITY: Primary | ICD-10-CM

## 2025-03-04 DIAGNOSIS — M25.512 LEFT SHOULDER PAIN, UNSPECIFIED CHRONICITY: ICD-10-CM

## 2025-03-04 DIAGNOSIS — M19.011 PRIMARY OSTEOARTHRITIS OF RIGHT SHOULDER: ICD-10-CM

## 2025-03-04 PROCEDURE — 99213 OFFICE O/P EST LOW 20 MIN: CPT | Mod: 25 | Performed by: PHYSICIAN ASSISTANT

## 2025-03-04 PROCEDURE — 20610 DRAIN/INJ JOINT/BURSA W/O US: CPT | Mod: RT | Performed by: PHYSICIAN ASSISTANT

## 2025-03-04 PROCEDURE — 73030 X-RAY EXAM OF SHOULDER: CPT | Mod: 50

## 2025-03-04 PROCEDURE — 3008F BODY MASS INDEX DOCD: CPT | Performed by: PHYSICIAN ASSISTANT

## 2025-03-04 PROCEDURE — 2500000004 HC RX 250 GENERAL PHARMACY W/ HCPCS (ALT 636 FOR OP/ED): Performed by: PHYSICIAN ASSISTANT

## 2025-03-04 PROCEDURE — 99213 OFFICE O/P EST LOW 20 MIN: CPT | Performed by: PHYSICIAN ASSISTANT

## 2025-03-04 RX ORDER — LIDOCAINE HYDROCHLORIDE 10 MG/ML
1 INJECTION, SOLUTION INFILTRATION; PERINEURAL
Status: COMPLETED | OUTPATIENT
Start: 2025-03-04 | End: 2025-03-04

## 2025-03-04 RX ORDER — TRIAMCINOLONE ACETONIDE 40 MG/ML
10 INJECTION, SUSPENSION INTRA-ARTICULAR; INTRAMUSCULAR
Status: COMPLETED | OUTPATIENT
Start: 2025-03-04 | End: 2025-03-04

## 2025-03-04 RX ADMIN — TRIAMCINOLONE ACETONIDE 10 MG: 40 INJECTION, SUSPENSION INTRA-ARTICULAR; INTRAMUSCULAR at 13:28

## 2025-03-04 RX ADMIN — LIDOCAINE HYDROCHLORIDE 1 ML: 10 INJECTION, SOLUTION INFILTRATION; PERINEURAL at 13:28

## 2025-03-04 ASSESSMENT — PATIENT HEALTH QUESTIONNAIRE - PHQ9
SUM OF ALL RESPONSES TO PHQ9 QUESTIONS 1 AND 2: 0
2. FEELING DOWN, DEPRESSED OR HOPELESS: NOT AT ALL
1. LITTLE INTEREST OR PLEASURE IN DOING THINGS: NOT AT ALL

## 2025-03-04 ASSESSMENT — PAIN SCALES - GENERAL
PAINLEVEL_OUTOF10: 4
PAINLEVEL_OUTOF10: 4

## 2025-03-04 ASSESSMENT — ENCOUNTER SYMPTOMS
DEPRESSION: 0
OCCASIONAL FEELINGS OF UNSTEADINESS: 0
LOSS OF SENSATION IN FEET: 0

## 2025-03-04 ASSESSMENT — PAIN - FUNCTIONAL ASSESSMENT: PAIN_FUNCTIONAL_ASSESSMENT: 0-10

## 2025-03-04 ASSESSMENT — PAIN DESCRIPTION - DESCRIPTORS: DESCRIPTORS: ACHING

## 2025-03-04 NOTE — PATIENT INSTRUCTIONS
Thank you for coming to see us today!     We are going to give you a referral for physical therapy  FOR THE LEFT AND RIGHT SHOULDER  Please call central scheduling to make this appointment.     Please follow up with us as needed

## 2025-03-05 ENCOUNTER — APPOINTMENT (OUTPATIENT)
Dept: PULMONOLOGY | Facility: CLINIC | Age: 60
End: 2025-03-05
Payer: MEDICAID

## 2025-03-06 ENCOUNTER — OFFICE VISIT (OUTPATIENT)
Dept: ORTHOPEDIC SURGERY | Facility: CLINIC | Age: 60
End: 2025-03-06
Payer: MEDICAID

## 2025-03-06 DIAGNOSIS — L90.5 SCAR IRRITATION: ICD-10-CM

## 2025-03-06 DIAGNOSIS — M19.049 CMC ARTHRITIS: Primary | ICD-10-CM

## 2025-03-06 PROCEDURE — 99213 OFFICE O/P EST LOW 20 MIN: CPT | Performed by: ORTHOPAEDIC SURGERY

## 2025-03-06 RX ORDER — BLOOD-GLUCOSE SENSOR
EACH MISCELLANEOUS
COMMUNITY
Start: 2025-02-04

## 2025-03-06 ASSESSMENT — PAIN - FUNCTIONAL ASSESSMENT: PAIN_FUNCTIONAL_ASSESSMENT: NO/DENIES PAIN
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
## 2025-03-19 ENCOUNTER — APPOINTMENT (OUTPATIENT)
Dept: SLEEP MEDICINE | Facility: CLINIC | Age: 60
End: 2025-03-19
Payer: MEDICAID

## 2025-03-19 ENCOUNTER — OFFICE VISIT (OUTPATIENT)
Dept: PAIN MEDICINE | Facility: CLINIC | Age: 60
End: 2025-03-19
Payer: MEDICAID

## 2025-03-19 VITALS — BODY MASS INDEX: 37.79 KG/M2 | WEIGHT: 180 LBS | HEIGHT: 58 IN

## 2025-03-19 DIAGNOSIS — M47.817 OSTEOARTHRITIS OF LUMBOSACRAL SPINE: ICD-10-CM

## 2025-03-19 DIAGNOSIS — G89.29 CHRONIC BILATERAL LOW BACK PAIN WITH BILATERAL SCIATICA: ICD-10-CM

## 2025-03-19 DIAGNOSIS — M54.9 BACK PAIN DUE TO INJURY: ICD-10-CM

## 2025-03-19 DIAGNOSIS — M54.41 CHRONIC BILATERAL LOW BACK PAIN WITH BILATERAL SCIATICA: ICD-10-CM

## 2025-03-19 DIAGNOSIS — E66.09 CLASS 2 OBESITY DUE TO EXCESS CALORIES WITH BODY MASS INDEX (BMI) OF 37.0 TO 37.9 IN ADULT, UNSPECIFIED WHETHER SERIOUS COMORBIDITY PRESENT: ICD-10-CM

## 2025-03-19 DIAGNOSIS — M53.3 PAIN IN SACRUM: Primary | ICD-10-CM

## 2025-03-19 DIAGNOSIS — Z98.890 HISTORY OF OPEN REDUCTION AND INTERNAL FIXATION (ORIF) PROCEDURE: ICD-10-CM

## 2025-03-19 DIAGNOSIS — E66.812 CLASS 2 OBESITY DUE TO EXCESS CALORIES WITH BODY MASS INDEX (BMI) OF 37.0 TO 37.9 IN ADULT, UNSPECIFIED WHETHER SERIOUS COMORBIDITY PRESENT: ICD-10-CM

## 2025-03-19 DIAGNOSIS — M54.42 CHRONIC BILATERAL LOW BACK PAIN WITH BILATERAL SCIATICA: ICD-10-CM

## 2025-03-19 PROCEDURE — 99401 PREV MED CNSL INDIV APPRX 15: CPT | Performed by: ANESTHESIOLOGY

## 2025-03-19 PROCEDURE — 99214 OFFICE O/P EST MOD 30 MIN: CPT | Performed by: ANESTHESIOLOGY

## 2025-03-19 RX ORDER — CELECOXIB 200 MG/1
200 CAPSULE ORAL 2 TIMES DAILY PRN
Qty: 60 CAPSULE | Refills: 0 | Status: SHIPPED | OUTPATIENT
Start: 2025-03-19 | End: 2025-04-18

## 2025-03-19 ASSESSMENT — ENCOUNTER SYMPTOMS
RESPIRATORY NEGATIVE: 1
GASTROINTESTINAL NEGATIVE: 1
BACK PAIN: 1
ENDOCRINE NEGATIVE: 1
WEAKNESS: 1
CARDIOVASCULAR NEGATIVE: 1
EYES NEGATIVE: 1
CONSTITUTIONAL NEGATIVE: 1
NUMBNESS: 1
PSYCHIATRIC NEGATIVE: 1
HEMATOLOGIC/LYMPHATIC NEGATIVE: 1

## 2025-03-19 ASSESSMENT — PAIN SCALES - GENERAL
PAINLEVEL_OUTOF10: 4
PAINLEVEL_OUTOF10: 4

## 2025-03-19 ASSESSMENT — PAIN - FUNCTIONAL ASSESSMENT: PAIN_FUNCTIONAL_ASSESSMENT: 0-10

## 2025-03-19 ASSESSMENT — PAIN DESCRIPTION - DESCRIPTORS: DESCRIPTORS: ACHING

## 2025-03-19 ASSESSMENT — LIFESTYLE VARIABLES: TOTAL SCORE: 7
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
## 2025-03-21 ENCOUNTER — EVALUATION (OUTPATIENT)
Dept: PHYSICAL THERAPY | Facility: CLINIC | Age: 60
End: 2025-03-21
Payer: MEDICAID

## 2025-03-21 DIAGNOSIS — Z98.890 HISTORY OF OPEN REDUCTION AND INTERNAL FIXATION (ORIF) PROCEDURE: ICD-10-CM

## 2025-03-21 DIAGNOSIS — M47.817 OSTEOARTHRITIS OF LUMBOSACRAL SPINE: ICD-10-CM

## 2025-03-21 DIAGNOSIS — M54.9 BACK PAIN DUE TO INJURY: ICD-10-CM

## 2025-03-21 PROCEDURE — 97162 PT EVAL MOD COMPLEX 30 MIN: CPT | Mod: GP

## 2025-03-25 ENCOUNTER — APPOINTMENT (OUTPATIENT)
Dept: SLEEP MEDICINE | Facility: CLINIC | Age: 60
End: 2025-03-25
Payer: MEDICAID

## 2025-04-01 ENCOUNTER — DOCUMENTATION (OUTPATIENT)
Dept: PHYSICAL THERAPY | Facility: CLINIC | Age: 60
End: 2025-04-01
Payer: MEDICAID

## 2025-04-01 NOTE — PROGRESS NOTES
"[facility]  New Visit Note        HISTORY OF PRESENT ILLNESS     The patient's referring provider is: Joao Butler APRN-*    HISTORY OF PRESENT ILLNESS   Ashley Kessler \"Isidoro" is a 59 y.o. female who presents to a [facility] for a sleep medicine evaluation with concerns of Consult, Unrefreshing Sleep, Excessive Daytime Sleepiness, Narcolepsy, and Leg Movement With Sleep.     PAST SLEEP HISTORY    Patient has the following sleep study results: HSAT was ineffective in 2022 due to not sleeping well, PSG In 2022 also was inconclusive due to not being able to fall asleep    She states she was having difficulty falling asleep at this time but is now able to fall asleep fairly easily    CURRENT HISTORY    She reports unrefreshing sleep, excessive daytime sleepiness, and difficulty staying asleep due to vivid dreams. She experiences dreams while falling asleep and has restless legs. The patient has no known family history of sleep apnea, and no family members are known to use CPAP therapy.    She does not currently have difficulty falling asleep but previously experienced issues with sleep initiation during the sleep study. Her sleep is often interrupted by vivid dreams. She is not aware of any snoring, though it has been mentioned by others in the past.    She does feel some of her sleepiness may be related to depression    She reports she dreams and sees things when she is falling asleep.  She feels when she falls asleep she quickly goes into the dreaming.  She also reports her dreams are very vivid.    She does have fainting like spells but is not sure they are related to emotion such as laughter or surprise.    Her symptoms started about 10 years ago.    There is no known family history of either narcolepsy or sleep apnea.  She is not aware of any family members that snore loudly.    STOP BANG 3  STOP S(on off), T  BANG A    SLEEP RELATED-ROS:  SLEEP " "SCHEDULE WEEKDAYS/WORKDAYS  Usual Bedtime 8 PM  Falls asleep around 8 PM  Wake time 7 AM    SLEEP SCHEDULE WEEKENDS/NON-WORKDAYS:  Usual Bedtime 9 PM  Wake time 9 AM    NAPS: 45-minute nap at 2 PM    AVERAGE SLEEP DURATION: 11 hours/day    PREFERRED SLEEP POSITION: Back    SLEEP INITIATION: No reported issues falling asleep; previous difficulty during sleep study setting.    SLEEP MAINTENANCE: Wakes up due to vivid dreams.    RECREATIONAL DRUG USE  SMOKING: Quit in 2025.  ALCOHOL: Consumes alcohol about once a week.  CAFFEINE: 2 caffeinated beverages per day.  MARIJUANA: Does not use.    ESS: 20      PHYSICAL EXAM     VITAL SIGNS: /70   Pulse 70   Ht 1.499 m (4' 11\")   Wt 76.7 kg (169 lb)   SpO2 95%   BMI 34.13 kg/m²      PREVIOUS WEIGHTS:  Wt Readings from Last 3 Encounters:   04/04/25 76.7 kg (169 lb)   03/19/25 81.6 kg (180 lb)   03/04/25 81.6 kg (180 lb)       PHYSICAL EXAM: GENERAL: alert oriented x 3 pleasant and cooperative no acute distress  MODIFIED MALLAMPATI SCORE: 3+  LATERAL PHARYNGEAL WALL: 2+  NECK EXAM: normal supple no adenopathy    RESULTS/DATA     No results found for: \"IRON\", \"TRANSFERRIN\", \"IRONSAT\", \"TIBC\", \"FERRITIN\"    ASSESSMENT/PLAN     Ms. Kessler is a 59 y.o. female and was referred to the [facility] for the following issues:    OBSTRUCTIVE SLEEP APNEA / SLEEPINESS/ HYPNOPOMPIC HALLUCINATIONS / ? CATAPLEXY  -Ordering sleep study to evaluate   -stop gabapentin prior to sleep study  -EDS ordered  -discussed sleep logs should be filled out 2 weeks prior to MSLT    RESTLESS LEG SYNDROME  -Check ferritin level     BMI>30  -Body mass index is 34.13 kg/m².  today  -With sufficient weight loss may no longer require treatment for MANUEL  "

## 2025-04-01 NOTE — PROGRESS NOTES
Physical Therapy Treatment    Patient Name: Ashley Kessler  MRN: [id number]  Encounter date:  4/3/2025                Visit Number:  3 (including evaluation)  Planned total visits: 10  Visits Authorized/Insurance Coverage:  NO AUTH, 100% COVERAGE, 30V PT , [employer] MEDICAID     Current Problem  Problem List Items Addressed This Visit    None      Surgery  ***    Surgery date:  ***    Precautions  Patient reports specific precautions regarding pelvis ORIF  PLAN IS TO AVOID AGGRESSIVE ROM OF HIPS   HOLD DEEP WATER PENDING FURTHER IMAGING- TO HAVE CT/MRI    Pain       Subjective  General        ***    Objective  ***    Treatment:  Aquatic Therapy:   Walk across pool for core control, balance and acclimation to water:  forward/backward/side step x 3 laps each    At HR {with/without:22422} UE support:    Heel raise x *** reps  Toe raise x *** reps  Hip ABD/EXT/SLR x *** reps  HS curls x *** reps  Mini-squats x *** reps  Hip ER (clam shells) *** reps    With noodle:  Trunk rotation x *** reps  Push/pull x *** reps  Straight arm noodle of forearms push downs x *** reps    March across pool with/without noodle x ** laps    At steps:  HS stretch *** sec x *** reps R/L LE's  Hip flexor stretch *** sec x *** reps R/L LE's    Step ups FWD X *** R/L first step  Lateral step ups x *** R/L single UE HR support    At bench:  Sit to stands, no UE's x *** reps  LAQ's *** x *** reps  Marching  *** x *** reps  Ankle pumps  *** x *** reps  Ankle circles  *** x *** reps  Bicycle  *** x *** reps  Scap retraction *** sec x 10    Walk across pool to reintroduce gravity  x 2 laps    Current HEP:  ***    Has patient been compliant with HEP? {Yes/No:77782}    Activity tolerance:  {Activity:54342}    OP EDUCATION:       Assessment:     Pt's response to treatment:  ***  Areas of improvements:  ***  Limitations/deficits:  ***    Pain end of session: ***    Plan:     {BASPLAN:61008}    Assessment of current progress against  goals:  {BASPTNOTEGOALASSESSMENT:32992}    Goals:  STG(3 visits)  Patient to tolerate 40 minutes of aquatic PT    LTG(10 visits)  Oswestry to<25 % impaired  Patient to perform ADLSs with pain < 2/10  Patient to sit for 60 without need to stand  Patient to stand for 20 minutes without need to sit  Patient to walk for 20 without need to sit

## 2025-04-01 NOTE — PROGRESS NOTES
"Physical Therapy                 Therapy Communication Note    Patient Name: Ashley Kessler \"Ted\"  MRN: [id number]  Department:   Room: Room/bed info not found  Today's Date: 4/1/2025     Discipline: Physical Therapy    Missed Visit Reason: Patient did not check in for their appointment or call to cancel     Missed Time: No Show    Comment: No show #1  "

## 2025-04-03 ENCOUNTER — APPOINTMENT (OUTPATIENT)
Dept: PHYSICAL THERAPY | Facility: CLINIC | Age: 60
End: 2025-04-03
Payer: MEDICAID

## 2025-04-03 ENCOUNTER — DOCUMENTATION (OUTPATIENT)
Dept: PHYSICAL THERAPY | Facility: CLINIC | Age: 60
End: 2025-04-03
Payer: MEDICAID

## 2025-04-03 NOTE — PROGRESS NOTES
"Physical Therapy                 Therapy Communication Note    Patient Name: Ashley Kessler \"Ted\"  MRN: [id number]  Department:   Room: Room/bed info not found  Today's Date: 4/3/2025     Discipline: Physical Therapy          Missed Visit Reason: Patient cancelled through MyChart    Missed Time: Cancel    Comment: Cancellation #1 (1 no show)  "

## 2025-04-04 ENCOUNTER — APPOINTMENT (OUTPATIENT)
Dept: SLEEP MEDICINE | Facility: CLINIC | Age: 60
End: 2025-04-04
Payer: MEDICAID

## 2025-04-04 VITALS
WEIGHT: 169 LBS | SYSTOLIC BLOOD PRESSURE: 104 MMHG | DIASTOLIC BLOOD PRESSURE: 70 MMHG | BODY MASS INDEX: 34.07 KG/M2 | HEART RATE: 70 BPM | HEIGHT: 59 IN | OXYGEN SATURATION: 95 %

## 2025-04-04 DIAGNOSIS — E83.10 DISORDER OF IRON METABOLISM: ICD-10-CM

## 2025-04-04 DIAGNOSIS — R44.2 HYPNAGOGIC HALLUCINATIONS: ICD-10-CM

## 2025-04-04 DIAGNOSIS — R29.818 SUSPECTED SLEEP APNEA: Primary | ICD-10-CM

## 2025-04-04 DIAGNOSIS — G47.19 EXCESSIVE DAYTIME SLEEPINESS: ICD-10-CM

## 2025-04-04 PROCEDURE — 3074F SYST BP LT 130 MM HG: CPT | Performed by: PHYSICIAN ASSISTANT

## 2025-04-04 PROCEDURE — 3078F DIAST BP <80 MM HG: CPT | Performed by: PHYSICIAN ASSISTANT

## 2025-04-04 PROCEDURE — 3008F BODY MASS INDEX DOCD: CPT | Performed by: PHYSICIAN ASSISTANT

## 2025-04-04 PROCEDURE — 99244 OFF/OP CNSLTJ NEW/EST MOD 40: CPT | Performed by: PHYSICIAN ASSISTANT

## 2025-04-04 RX ORDER — VORTIOXETINE 10 MG/1
10 TABLET, FILM COATED ORAL DAILY
COMMUNITY
Start: 2025-03-17

## 2025-04-04 ASSESSMENT — SLEEP AND FATIGUE QUESTIONNAIRES
HOW LIKELY ARE YOU TO NOD OFF OR FALL ASLEEP WHILE LYING DOWN TO REST IN THE AFTERNOON WHEN CIRCUMSTANCES PERMIT: HIGH CHANCE OF DOZING
HOW LIKELY ARE YOU TO NOD OFF OR FALL ASLEEP WHEN YOU ARE A PASSENGER IN A CAR FOR AN HOUR WITHOUT A BREAK: HIGH CHANCE OF DOZING
HOW LIKELY ARE YOU TO NOD OFF OR FALL ASLEEP WHILE WATCHING TV: HIGH CHANCE OF DOZING
HOW LIKELY ARE YOU TO NOD OFF OR FALL ASLEEP WHILE SITTING AND READING: HIGH CHANCE OF DOZING
HOW LIKELY ARE YOU TO NOD OFF OR FALL ASLEEP WHILE SITTING AND TALKING TO SOMEONE: MODERATE CHANCE OF DOZING
HOW LIKELY ARE YOU TO NOD OFF OR FALL ASLEEP IN A CAR, WHILE STOPPED FOR A FEW MINUTES IN TRAFFIC: SLIGHT CHANCE OF DOZING
SITING INACTIVE IN A PUBLIC PLACE LIKE A CLASS ROOM OR A MOVIE THEATER: HIGH CHANCE OF DOZING
HOW LIKELY ARE YOU TO NOD OFF OR FALL ASLEEP WHILE SITTING QUIETLY AFTER LUNCH WITHOUT ALCOHOL: MODERATE CHANCE OF DOZING
ESS-CHAD TOTAL SCORE: 20

## 2025-04-04 ASSESSMENT — PATIENT HEALTH QUESTIONNAIRE - PHQ9
2. FEELING DOWN, DEPRESSED OR HOPELESS: SEVERAL DAYS
1. LITTLE INTEREST OR PLEASURE IN DOING THINGS: SEVERAL DAYS
10. IF YOU CHECKED OFF ANY PROBLEMS, HOW DIFFICULT HAVE THESE PROBLEMS MADE IT FOR YOU TO DO YOUR WORK, TAKE CARE OF THINGS AT HOME, OR GET ALONG WITH OTHER PEOPLE: SOMEWHAT DIFFICULT
SUM OF ALL RESPONSES TO PHQ9 QUESTIONS 1 & 2: 2

## 2025-04-04 ASSESSMENT — PAIN SCALES - GENERAL: PAINLEVEL_OUTOF10: 0-NO PAIN

## 2025-04-04 ASSESSMENT — LIFESTYLE VARIABLES: HOW MANY STANDARD DRINKS CONTAINING ALCOHOL DO YOU HAVE ON A TYPICAL DAY: PATIENT DOES NOT DRINK

## 2025-04-04 NOTE — PATIENT INSTRUCTIONS
Thank you for coming to the Sleep Medicine Clinic today! Your sleep medicine provider today was: Gold Grossman PA-C Below is a summary of your treatment plan, other important information, and our contact numbers:      TREATMENT PLAN     Call [phone]), option 3 to schedule your sleep study. When you have an appointment please call us back at [phone] to schedule a followup appointment 3-4 weeks after to review results.    They will provide sleep logs to track sleep up until sleep study.    Also get your iron tests done at any  lab. I'll let you know about the results.    Obstructive Sleep Apnea (MANUEL) is a sleep disorder where your upper airway muscles relax during sleep and the airway intermittently and repetitively narrows and collapses leading to partially blocked airway (hypopnea) or completely blocked airway (apnea) which, in turn, can disrupt breathing in sleep, lower oxygen levels while you sleep and cause night time wakings. Because both apnea and hypopnea may cause higher carbon dioxide or low oxygen levels, untreated MANUEL can lead to heart arrhythmia, elevation of blood pressure, and make it harder for the body to consolidate memory and facilitate metabolism (leading to higher blood sugars at night). Frequent partial arousals occur during sleep resulting in sleep deprivation and daytime sleepiness. MANUEL is associated with an increased risk of cardiovascular disease, stroke, hypertension, and insulin resistance. Moreover, untreated MANUEL with excessive daytime sleepiness can increase the risk of motor vehicular accidents.    Some conservative strategies for MANUEL regardless of MANUEL severity are:   Positional therapy - Avoid sleeping on your back.   Healthy diet and regular exercise to optimize weight is highly encouraged.   Avoid alcohol late in the evening and sedative-hypnotics as these substances can make sleep apnea worse.   Improve breathing through the nose with intranasal steroid spray,  saline rinse, or antihistamines    Safety: Avoid driving vehicle and operating heavy equipment while sleepy. Drowsy driving may lead to life-threatening motor vehicle accidents. A person driving while sleepy is 5 times more likely to have an accident. If you feel sleepy, pull over and take a short power nap (sleep for less than 30 minutes). Otherwise, ask somebody to drive you.    Treatment options for sleep apnea include weight management, positional therapy, Positive Airway Therapy (PAP) therapy, oral appliance therapy, hypoglossal nerve stimulator (Inspire) and select airway surgeries.      OUR SLEEP TESTING LOCATIONS     Our team will contact you to schedule your sleep study, however, you can contact us as follow:  Main Phone Line (scheduling only): [phone]), option 3  Adult and Pediatric Locations  [facility] (6 years and older): [facility] - 4th floor ([address]) After hours line: [phone]  [facility] (Main campus: All ages): [facility], 6th floor. After hours line: [phone]   [place] (18 years and older): [address] [facility], 2nd floor  [facility] (18 years and older): [address] [facility]; 4th floor  After hours line: [phone]  [facility] (18 years and older) at [place]: [address] [facility] [address]  After hours line: [phone]   [facility] (5 years and older; younger considered on case-by-case basis): [address]; [address], [address]. Scheduling  After hours line: [phone]   [place] (6 years and older): [address]; [address]; [address]   [place] (6 years and older): [address] [facility] [address], [address]  After hours line: [phone]   [facility] (13 years and older) in [place]: [address], 2nd floor  After hours line: [phone]   [place] (13 year and older): [address]  After hours line: [phone]      IMPORTANT PHONE NUMBERS  "    Sleep Medicine Clinic Fax: [phone]  Appointments (for Adult Sleep Clinic): [phone] - option 2  Appointments (For Sleep Studies): [phone] - option 3  Behavioral Sleep Medicine: [phone]    [employer]): [phone]  For clinical questions and refilling prescriptions: [phone]  Lauren Eubanks (For [place]goc): P: [phone]  F: [phone]       CONTACTING YOUR SLEEP MEDICINE PROVIDER     Send a message directly to your provider through \"My Chart\", which is the email service through your  Records Account: [email or web address]   Call [phone] and leave a message. One of the administrative assistants will forward the message to your sleep medicine provider through \"My Chart\" and/or email.     Your sleep medicine provider for this visit was: Gold Grossman PA-C    "

## 2025-04-05 ENCOUNTER — HOSPITAL ENCOUNTER (OUTPATIENT)
Dept: RADIOLOGY | Facility: HOSPITAL | Age: 60
Discharge: HOME | End: 2025-04-05
Payer: MEDICAID

## 2025-04-05 DIAGNOSIS — M53.3 PAIN IN SACRUM: ICD-10-CM

## 2025-04-05 DIAGNOSIS — Z98.890 HISTORY OF OPEN REDUCTION AND INTERNAL FIXATION (ORIF) PROCEDURE: ICD-10-CM

## 2025-04-05 DIAGNOSIS — G89.29 CHRONIC BILATERAL LOW BACK PAIN WITH BILATERAL SCIATICA: ICD-10-CM

## 2025-04-05 DIAGNOSIS — M54.41 CHRONIC BILATERAL LOW BACK PAIN WITH BILATERAL SCIATICA: ICD-10-CM

## 2025-04-05 DIAGNOSIS — M54.42 CHRONIC BILATERAL LOW BACK PAIN WITH BILATERAL SCIATICA: ICD-10-CM

## 2025-04-05 PROCEDURE — 72192 CT PELVIS W/O DYE: CPT

## 2025-04-05 PROCEDURE — 72148 MRI LUMBAR SPINE W/O DYE: CPT

## 2025-04-05 PROCEDURE — 72148 MRI LUMBAR SPINE W/O DYE: CPT | Performed by: RADIOLOGY

## 2025-04-05 PROCEDURE — 72192 CT PELVIS W/O DYE: CPT | Performed by: RADIOLOGY

## 2025-04-07 NOTE — PROGRESS NOTES
"    Physical Therapy Treatment    Patient Name: Ashley Kessler  MRN: [id number]  Encounter date:  4/9/2025  Time Calculation  Start Time: 1230  Stop Time: 1314  Time Calculation (min): 44 min     PT Therapeutic Procedures Time Entry  Aquatic Therapy Time Entry: 43     Visit Number:  2 (including evaluation)  Planned total visits: 10  Visits Authorized/Insurance Coverage:  NO AUTH, 100% COVERAGE, 30V PT , [employer] MEDICAID     Current Problem  Problem List Items Addressed This Visit             ICD-10-CM    Back pain due to injury M54.9       Surgery  Pelvic ORIF     Surgery date: 2023    Precautions  Patient reports specific precautions regarding pelvis ORIF    PLAN IS TO AVOID AGGRESSIVE ROM OF HIPS   HOLD DEEP WATER PENDING FURTHER IMAGING- TO HAVE CT/MRI    Pain  0/10    Subjective  General       Patient notes she is having a good day today w/ decreased low back pain. Patient notes she has been performing her HEP exercises w/ counter support.      Objective:      Fair balance with across pool ambulation and standing SLS exercises w/ BUE support on rails.  Fair seated posture, cueing provided to \"sit up taller\" prior to start of LAQ, fair carryover through remainder of therex, additional cueing provided. RLE weaker during therex vs LLE.      Treatment:  Aquatic Therapy:   Walk across pool for core control, balance and acclimation to water:  forward/backward/side step x 3 laps each    At HR with UE support: increased tightness w/ exercise  Heel raise x 20 reps  Toe raise x 20 reps  Hip ABD/EXT/SLR x 20 reps  HS curls x 20 reps  Mini-squats x 20  reps    With kickboard:  Push/pull w/ TrA bracing x 20 reps    March across pool without noodle 2 laps    Step ups FWD X 10 R/L first step    At bench:  Sit to stands, no UE's x 10 reps  LAQ's, alternating x 2'   Marching x 2'   Ankle pumps x 2'     At stairs: hamstring stretches 1 x 20\" L/R    Current HEP:  Aquatics only at this time.    Has patient been compliant with HEP? " N/A    Activity tolerance:  Good    OP EDUCATION:    Assessment:    Pt's response to treatment: Good response to standing and seated therex introduced today with cueing. Patient has potential to improve balance during across pool walking, standing therex, and seated posture. RLE radicular symptoms reported with hamstring stretches, discontinued after first stretch. Post exercise fatigue and soreness vs low back or pelvic pain with therex.     Areas of improvements: Improved exercise endurance w/o low back pain increase.   Limitations/deficits: Low back pain and tightness may increase with ADLs     Pain end of session: 2/10    Plan:    Continue with current POC/no changes    Assessment of current progress against goals:  Progressing toward functional goals and Symptomatic relief with treatment    Goals:  STG(3 visits)  Patient to tolerate 40 minutes of aquatic PT    LTG(10 visits)  Oswestry to<25 % impaired  Patient to perform ADLSs with pain < 2/10  Patient to sit for 60 without need to stand  Patient to stand for 20 minutes without need to sit  Patient to walk for 20 without need to sit

## 2025-04-09 ENCOUNTER — TREATMENT (OUTPATIENT)
Dept: PHYSICAL THERAPY | Facility: CLINIC | Age: 60
End: 2025-04-09
Payer: MEDICAID

## 2025-04-09 DIAGNOSIS — M54.9 BACK PAIN DUE TO INJURY: ICD-10-CM

## 2025-04-09 PROCEDURE — 97113 AQUATIC THERAPY/EXERCISES: CPT | Mod: CQ,GP | Performed by: SPECIALIST/TECHNOLOGIST

## 2025-04-10 NOTE — PROGRESS NOTES
Physical Therapy Treatment    Patient Name: Ashley Kessler  MRN: [id number]  Encounter date:  4/11/2025  Time Calculation  Start Time: 1100  Stop Time: 1145  Time Calculation (min): 45 min     PT Therapeutic Procedures Time Entry  Aquatic Therapy Time Entry: 44     Visit Number:  3 (including evaluation)  Planned total visits: 10  Visits Authorized/Insurance Coverage:  NO AUTH, 100% COVERAGE, 30V PT , [employer] MEDICAID     Current Problem  Problem List Items Addressed This Visit             ICD-10-CM    Back pain due to injury M54.9     Surgery  Pelvic ORIF     Surgery date: 2023    Precautions  Patient reports specific precautions regarding pelvis ORIF    PLAN IS TO AVOID AGGRESSIVE ROM OF HIPS   HOLD DEEP WATER PENDING FURTHER IMAGING- TO HAVE CT/MRI    Pain  0/10    Subjective  General        Patient reported soreness after last session, but denies increased low back or pelvis pain. Patient however states her low back pain can increase from unknown causes throughout ADLs she attributes to low back arthritis.     Objective:      Improved balance, Fair +, w/ standing SLS exercises w/ BUE support on rails. Improved seated posture on bench, fewer cues provided to decrease forward rounding. Strength deficit in RLE vs LLE most noticeable during SLS exercises and stair step ups. BUE support needed on rails for standing exercises.      Treatment:  Aquatic Therapy:   Walk across pool for core control, balance and acclimation to water:  forward/backward/side step x 3 laps each    At HR with UE support: increased tightness w/ exercise  Heel raise x 30 reps  Toe raise x 30 reps  Hip ABD/EXT/SLR x 30 reps  HS curls x 30 reps  Mini-squats x 30 reps    With kickboard:  Push/pull w/ TrA bracing x 20 reps    March across pool without noodle 2 laps    At bench:  Sit to stands, no UE's x 10 reps - Bench height   LAQ's, alternating x 2'   Marching x 2' - increased low back pain  Ankle pumps x 30 reps vs for time due to B ankle  "soreness    Step ups, FWD and lateral, x 10 R/L first step                   At stairs: hamstring stretches 1 x 20\" L/R  DNP      Current HEP:  Aquatics only at this time.    Has patient been compliant with HEP? N/A    Activity tolerance:  Good    OP EDUCATION:    Assessment:    Pt's response to treatment: Good response to standing and seated exercises, good carry over of exercise instructions from previous appointment. Ankle pumps performed for repetitions vs time due to reported increased soreness. Pt requested step ups to improve her ability to ascend stairs. Good response to lateral step ups added today. Hamstring stretches not performed secondary to RLE radicular symptoms with that activity last session. Post exercise BLE fatigue vs increased pain.     Areas of improvements: improved   Limitations/deficits: Low back pain and tightness may increase with ADLs     Pain end of session: 2/10    Plan:    Continue with current POC/no changes    Assessment of current progress against goals:  Progressing toward functional goals and Symptomatic relief with treatment    Goals:  STG(3 visits)  Patient to tolerate 40 minutes of aquatic PT    LTG(10 visits)  Oswestry to<25 % impaired  Patient to perform ADLSs with pain < 2/10  Patient to sit for 60 without need to stand  Patient to stand for 20 minutes without need to sit  Patient to walk for 20 without need to sit    "

## 2025-04-11 ENCOUNTER — TREATMENT (OUTPATIENT)
Dept: PHYSICAL THERAPY | Facility: CLINIC | Age: 60
End: 2025-04-11
Payer: MEDICAID

## 2025-04-11 DIAGNOSIS — M54.9 BACK PAIN DUE TO INJURY: ICD-10-CM

## 2025-04-11 PROCEDURE — 97113 AQUATIC THERAPY/EXERCISES: CPT | Mod: CQ,GP | Performed by: SPECIALIST/TECHNOLOGIST

## 2025-04-14 NOTE — PROGRESS NOTES
"    Physical Therapy Treatment    Patient Name: Ashley Kessler  MRN: [id number]  Encounter date:  4/15/2025              Visit Number:  4 (including evaluation)  Planned total visits: 10  Visits Authorized/Insurance Coverage:  NO AUTH, 100% COVERAGE, 30V PT , [employer] MEDICAID     Current Problem  Problem List Items Addressed This Visit    None      Surgery  Pelvic ORIF     Surgery date: 2023    Precautions  Patient reports specific precautions regarding pelvis ORIF    PLAN IS TO AVOID AGGRESSIVE ROM OF HIPS   HOLD DEEP WATER PENDING FURTHER IMAGING- TO HAVE CT/MRI    Pain  0/10    Subjective  General        Patient reported soreness after last session, but denies increased low back or pelvis pain. Patient however states her low back pain can increase from unknown causes throughout ADLs she attributes to low back arthritis.     Objective:      Improved balance, Fair +, w/ standing SLS exercises w/ BUE support on rails. Improved seated posture on bench, fewer cues provided to decrease forward rounding. Strength deficit in RLE vs LLE most noticeable during SLS exercises and stair step ups. BUE support needed on rails for standing exercises.      Treatment:  Aquatic Therapy:   Walk across pool for core control, balance and acclimation to water:  forward/backward/side step x 3 laps each    At HR with UE support: increased tightness w/ exercise  Heel raise x 30 reps  Toe raise x 30 reps  Hip ABD/EXT/SLR x 30 reps  HS curls x 30 reps  Mini-squats x 30 reps    With kickboard:  Push/pull w/ TrA bracing x 20 reps    March across pool without noodle 2 laps    At bench:  Sit to stands, no UE's x 10 reps - Bench height   LAQ's, alternating x 2'   Marching x 2' - increased low back pain  Ankle pumps x 30 reps vs for time due to B ankle soreness    Step ups, FWD and lateral, x 10 R/L first step                   At stairs: hamstring stretches 1 x 20\" L/R  DNP      Current HEP:  Aquatics only at this time.    Has patient been compliant " with HEP? N/A    Activity tolerance:  Good    OP EDUCATION:    Assessment:    Pt's response to treatment: Good response to standing and seated exercises, good carry over of exercise instructions from previous appointment. Ankle pumps performed for repetitions vs time due to reported increased soreness. Pt requested step ups to improve her ability to ascend stairs. Good response to lateral step ups added today. Hamstring stretches not performed secondary to RLE radicular symptoms with that activity last session. Post exercise BLE fatigue vs increased pain.     Areas of improvements: improved   Limitations/deficits: Low back pain and tightness may increase with ADLs     Pain end of session: 2/10    Plan:    Continue with current POC/no changes    Assessment of current progress against goals:  Progressing toward functional goals and Symptomatic relief with treatment    Goals:  STG(3 visits)  Patient to tolerate 40 minutes of aquatic PT    LTG(10 visits)  Oswestry to<25 % impaired  Patient to perform ADLSs with pain < 2/10  Patient to sit for 60 without need to stand  Patient to stand for 20 minutes without need to sit  Patient to walk for 20 without need to sit

## 2025-04-15 ENCOUNTER — APPOINTMENT (OUTPATIENT)
Dept: PHYSICAL THERAPY | Facility: CLINIC | Age: 60
End: 2025-04-15
Payer: MEDICAID
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
## 2025-04-16 ENCOUNTER — OFFICE VISIT (OUTPATIENT)
Dept: PULMONOLOGY | Facility: CLINIC | Age: 60
End: 2025-04-16
Payer: MEDICAID

## 2025-04-16 VITALS
OXYGEN SATURATION: 97 % | BODY MASS INDEX: 34.47 KG/M2 | HEIGHT: 59 IN | HEART RATE: 64 BPM | DIASTOLIC BLOOD PRESSURE: 94 MMHG | WEIGHT: 171 LBS | SYSTOLIC BLOOD PRESSURE: 137 MMHG

## 2025-04-16 DIAGNOSIS — F17.210 NICOTINE DEPENDENCE, CIGARETTES, UNCOMPLICATED: ICD-10-CM

## 2025-04-16 DIAGNOSIS — J31.0 CHRONIC RHINITIS: ICD-10-CM

## 2025-04-16 DIAGNOSIS — T78.3XXS ANGIOEDEMA, SEQUELA: ICD-10-CM

## 2025-04-16 DIAGNOSIS — J45.20 MILD INTERMITTENT ASTHMA WITHOUT COMPLICATION (HHS-HCC): Primary | ICD-10-CM

## 2025-04-16 PROCEDURE — 99214 OFFICE O/P EST MOD 30 MIN: CPT | Performed by: NURSE PRACTITIONER

## 2025-04-16 PROCEDURE — 3008F BODY MASS INDEX DOCD: CPT | Performed by: NURSE PRACTITIONER

## 2025-04-16 PROCEDURE — 3075F SYST BP GE 130 - 139MM HG: CPT | Performed by: NURSE PRACTITIONER

## 2025-04-16 PROCEDURE — 3080F DIAST BP >= 90 MM HG: CPT | Performed by: NURSE PRACTITIONER

## 2025-04-16 RX ORDER — CETIRIZINE HYDROCHLORIDE 10 MG/1
10 TABLET ORAL DAILY
Qty: 90 TABLET | Refills: 3 | Status: SHIPPED | OUTPATIENT
Start: 2025-04-16

## 2025-04-16 ASSESSMENT — ENCOUNTER SYMPTOMS
ABDOMINAL PAIN: 0
JOINT SWELLING: 0
VOMITING: 0
TREMORS: 0
DIZZINESS: 0
MYALGIAS: 0
HEADACHES: 0
UNEXPECTED WEIGHT CHANGE: 0
EYE ITCHING: 0
CHILLS: 0
EYE REDNESS: 0
ACTIVITY CHANGE: 0
ROS GI COMMENTS: DENIES ACID REFLUX
FEVER: 0
CHEST TIGHTNESS: 0
PALPITATIONS: 0
SORE THROAT: 0
BRUISES/BLEEDS EASILY: 0
SINUS PRESSURE: 0
WEAKNESS: 0
AGITATION: 0
NAUSEA: 0
NERVOUS/ANXIOUS: 0
STRIDOR: 0
SINUS PAIN: 0
SLEEP DISTURBANCE: 0
TROUBLE SWALLOWING: 0
FATIGUE: 0
VOICE CHANGE: 0
COUGH: 0
DIARRHEA: 0
BACK PAIN: 0
RHINORRHEA: 1
ARTHRALGIAS: 0
WHEEZING: 0
APPETITE CHANGE: 0
SHORTNESS OF BREATH: 0

## 2025-04-16 ASSESSMENT — LIFESTYLE VARIABLES
HOW OFTEN DO YOU HAVE SIX OR MORE DRINKS ON ONE OCCASION: NEVER
HOW OFTEN DO YOU HAVE A DRINK CONTAINING ALCOHOL: MONTHLY OR LESS
SKIP TO QUESTIONS 9-10: 1
AUDIT-C TOTAL SCORE: 1
HOW MANY STANDARD DRINKS CONTAINING ALCOHOL DO YOU HAVE ON A TYPICAL DAY: 1 OR 2

## 2025-04-16 ASSESSMENT — PAIN SCALES - GENERAL: PAINLEVEL_OUTOF10: 0-NO PAIN

## 2025-04-16 NOTE — PATIENT INSTRUCTIONS
"Today we discussed how you have been feeling; happy to hear things are stable.    -Continue Albuterol Inhaler; 2 puffs every 4-6 hours as needed for shortness of breath. You can also take this 10-15 minutes prior to exertional activity to help \"prime\" your lungs.  -Monitor the frequency of how often you are needing your albuterol.  If you find yourself needing this most days of the week/multiple times a day; please give me a call.  At that point in time we will likely get you restarted on your former Breo 100 inhaler  -Start cetirizine 10 mg daily in the evening.  This will help with your chronic runny nose (re-sent prescription again today)  -Prior referral to allergist due to the event you had leading up to your hospitalization with the tongue/mouth swelling  -Continue following with sleep medicine   -Great job quitting cigarettes! Almost there with quitting vaping!    Thank you for visiting the pulmonary clinic today! It was a pleasure to participate in your care.  Please return to clinic 1 year or sooner if needed.    Joao Butler, CNP  My Office Number: [phone]   CT Scheduling: [phone]  PFT (Pulmonary Function Test) Scheduling: [phone]  Follow Up Visit Scheduling: [phone]  My Nurse: Tia Wolfe RN & Amber Mejia, RN    To reach the nurse, Tia Wolfe RN, please call [phone]). If unable to reach Tia, please contact Amber Mejia RN at ([phone]). Both nurses have secure voicemail's if needing to leave a message.    **For urgent needs such as medication issues/refills, active sick symptoms or medical concerns please call the office directly and speak to the pulmonary nurse. For nonurgent messages please use [employer]. Thank you.**    "

## 2025-04-17 ENCOUNTER — TREATMENT (OUTPATIENT)
Dept: PHYSICAL THERAPY | Facility: CLINIC | Age: 60
End: 2025-04-17
Payer: MEDICAID

## 2025-04-17 DIAGNOSIS — M54.9 BACK PAIN DUE TO INJURY: ICD-10-CM

## 2025-04-17 PROCEDURE — 97113 AQUATIC THERAPY/EXERCISES: CPT | Mod: GP,CQ

## 2025-04-17 NOTE — PROGRESS NOTES
"    Physical Therapy Treatment    Patient Name: Ashley Kessler  MRN: [id number]  Encounter date:  4/17/2025  Time Calculation  Start Time: 1245  Stop Time: 1330  Time Calculation (min): 45 min     PT Therapeutic Procedures Time Entry  Aquatic Therapy Time Entry: 40     Visit Number:  4 (including evaluation)  Planned total visits: 10  Visits Authorized/Insurance Coverage:  NO AUTH, 100% COVERAGE, 30V PT , [employer] MEDICA[employer]     Current Problem  Problem List Items Addressed This Visit           ICD-10-CM    Back pain due to injury M54.9     Surgery  Pelvic ORIF     Surgery date: 2023    Precautions  Patient reports specific precautions regarding pelvis ORIF    PLAN IS TO AVOID AGGRESSIVE ROM OF HIPS   HOLD DEEP WATER PENDING FURTHER IMAGING- TO HAVE CT/MRI    Pain  0/10    Subjective  General       \"It is easier to tie my right shoe now.\"      Objective:  Lead LLE with descent into pool with BUE     Treatment:  Aquatic Therapy:     Lead LLE with descent into pool with BUE     Walk across pool for core control, balance and acclimation to water:  forward/backward/side step x 3 laps each    At HR with UE support: increased tightness w/ exercise  Heel toe rocks x 20 BUE  Mini squats 2x10 BUE   Dynamic Hip ABD/EXT/SLR 2x10  single UE  - cued form    March across pool with blue kick board 3/4 submerged 3 laps    HS curls 2x10     With kickboard:  Push/pull w/ TrA bracing 2x10 lead ea LE     At bench:  LAQ with eccentric return 2x 10   Sit to stands, no UE's 2 x 10 reps - Bench height   Hip add/abd 1 min   Hip flutter kick 1 min    Ankle pumps x 30 with hold on end range     Step ups, FWD and lateral, x 10 R/L first step                At stairs: hamstring stretches 1 x 20\" L/R      Current HEP:  Aquatics only at this time.    Has patient been compliant with HEP? N/A    Activity tolerance:  Good    OP EDUCATION:    Assessment:  Activity challenge increased in the shallow end and the patient did well with form and activity. No pain " with exercise nor at exit.     Pain end of session:  0/10     Plan:    Continue with current POC/no changes    Assessment of current progress against goals:  Progressing toward functional goals and Symptomatic relief with treatment    Goals:  STG(3 visits)  Patient to tolerate 40 minutes of aquatic PT    LTG(10 visits)  Oswestry to<25 % impaired  Patient to perform ADLSs with pain < 2/10  Patient to sit for 60 without need to stand  Patient to stand for 20 minutes without need to sit  Patient to walk for 20 without need to sit

## 2025-04-22 ENCOUNTER — TREATMENT (OUTPATIENT)
Dept: PHYSICAL THERAPY | Facility: CLINIC | Age: 60
End: 2025-04-22
Payer: MEDICAID

## 2025-04-22 DIAGNOSIS — M54.9 BACK PAIN DUE TO INJURY: ICD-10-CM

## 2025-04-22 PROCEDURE — 97113 AQUATIC THERAPY/EXERCISES: CPT | Mod: GP | Performed by: PHYSICAL THERAPIST

## 2025-04-22 NOTE — PROGRESS NOTES
Physical Therapy Treatment    Patient Name: Ashley Kessler  MRN: [id number]  Encounter date:  4/22/2025  Time Calculation  Start Time: 1153  Stop Time: 1231  Time Calculation (min): 38 min     PT Therapeutic Procedures Time Entry  Aquatic Therapy Time Entry: 38     Visit Number:  5 (including evaluation)  Planned total visits: 10  Visits Authorized/Insurance Coverage:  NO AUTH, 100% COVERAGE, 30V PT , [employer] MED[employer]     Current Problem  Problem List Items Addressed This Visit           ICD-10-CM    Back pain due to injury M54.9     Surgery  Pelvic ORIF     Surgery date: 2023    Precautions  Patient reports specific precautions regarding pelvis ORIF    PLAN IS TO AVOID AGGRESSIVE ROM OF HIPS   HOLD DEEP WATER PENDING FURTHER IMAGING- TO HAVE CT/MRI    Pain  0/10    Subjective  General      Feeling good with LB, but R foot feels like neuropathy pain, but has bad bunions there as well.  I don't think I have any restrictions from my pelvis surgery in 2023.  I get the results from back films with doctor tomorrow.    Objective:  Films 4/2025:  L4/L5  Trace spondylolisthesis without MR evidence of spondylolysis.  Narrowing of the thecal sac to a proximally 6 mm in diameter on axial  T2 weighted image series 801 image 12/22. Severe bilateral foraminal  narrowing. L5/S1  Bilateral facet hypertrophy without canal stenosis. Moderate  bilateral foraminal narrowing without focal disc herniation.  IMPRESSION:  * Spondylolisthesis at L4/L5 with severe canal and foraminal  narrowing.    Hx: lacunar infarcts    IMPRESSION:  1. Postsurgical fixation across the SI joints with stable appearance  with osteoarthritic degenerative change without acute osseous  abnormality. No osseous fusion across the SI joints. Mild persistent  widening of the right SI joint noted appearing stable. No lucency  across the fixation.      2. Postoperative fixation bilateral periacetabular fracture with no  evidence for hardware failure      3. Mild  "osteoarthritic degenerative change bilateral hips.    Treatment:  Aquatic Therapy:     Lead LLE with descent into pool with BUE     Walk across pool for core control, balance and acclimation to water:  forward/backward/side step x 3 laps each    At HR with UE support: increased tightness w/ exercise  Heel toe rocks x 20 BUE  Mini squats 2x10 BUE   Dynamic Hip ABD/EXT/SLR 2x10  single UE  - cued form    March across pool with blue kick board 3/4 submerged 3 laps    HS curls 2x10     With kickboard:  Push/pull w/ TrA bracing 2x10 lead ea LE     At bench:  LAQ with eccentric return 2x 10   Sit to stands, no UE's 2 x 10 reps - Bench height   Hip add/abd 1 min   Hip flutter kick 1 min    Ankle pumps x 30 with hold on end range     Step ups, FWD and lateral, x 10 R/L first step                At stairs: hamstring stretches 2 x 20\" L/R      Current HEP:  Aquatics only at this time.    Has patient been compliant with HEP? N/A    Activity tolerance:  Good    OP EDUCATION:    Assessment:  Brief moment of burning R thigh with step ups.  Short term R big toe discomfort with HR/TR.  Good pace and form with all te's after cues.    Pain end of session:  0/10     Plan:  Discuss pt's results with Dr to review CT/MRI.  Discuss with pt plans for exercises after PT is done.  Continue with current POC/no changes    Assessment of current progress against goals:  Progressing toward functional goals and Symptomatic relief with treatment    Goals:  STG(3 visits)  Patient to tolerate 40 minutes of aquatic PT    LTG(10 visits)  Oswestry to<25 % impaired  Patient to perform ADLSs with pain < 2/10  Patient to sit for 60 without need to stand  Patient to stand for 20 minutes without need to sit  Patient to walk for 20 without need to sit    "

## 2025-04-23 ENCOUNTER — OFFICE VISIT (OUTPATIENT)
Dept: PAIN MEDICINE | Facility: CLINIC | Age: 60
End: 2025-04-23
Payer: MEDICAID

## 2025-04-23 VITALS — OXYGEN SATURATION: 93 % | HEART RATE: 73 BPM | SYSTOLIC BLOOD PRESSURE: 124 MMHG | DIASTOLIC BLOOD PRESSURE: 80 MMHG

## 2025-04-23 DIAGNOSIS — M54.42 CHRONIC BILATERAL LOW BACK PAIN WITH BILATERAL SCIATICA: Primary | ICD-10-CM

## 2025-04-23 DIAGNOSIS — M54.41 CHRONIC BILATERAL LOW BACK PAIN WITH BILATERAL SCIATICA: Primary | ICD-10-CM

## 2025-04-23 DIAGNOSIS — G89.29 CHRONIC BILATERAL LOW BACK PAIN WITH BILATERAL SCIATICA: Primary | ICD-10-CM

## 2025-04-23 DIAGNOSIS — M54.16 LUMBAR RADICULOPATHY: ICD-10-CM

## 2025-04-23 PROCEDURE — 99214 OFFICE O/P EST MOD 30 MIN: CPT | Performed by: ANESTHESIOLOGY

## 2025-04-23 PROCEDURE — 3074F SYST BP LT 130 MM HG: CPT | Performed by: ANESTHESIOLOGY

## 2025-04-23 PROCEDURE — 3079F DIAST BP 80-89 MM HG: CPT | Performed by: ANESTHESIOLOGY

## 2025-04-23 ASSESSMENT — ENCOUNTER SYMPTOMS
LOSS OF SENSATION IN FEET: 0
HEMATOLOGIC/LYMPHATIC NEGATIVE: 1
CARDIOVASCULAR NEGATIVE: 1
RESPIRATORY NEGATIVE: 1
NUMBNESS: 1
ENDOCRINE NEGATIVE: 1
OCCASIONAL FEELINGS OF UNSTEADINESS: 0
WEAKNESS: 1
CONSTITUTIONAL NEGATIVE: 1
DEPRESSION: 0
EYES NEGATIVE: 1
BACK PAIN: 1
PSYCHIATRIC NEGATIVE: 1
GASTROINTESTINAL NEGATIVE: 1

## 2025-04-23 ASSESSMENT — PATIENT HEALTH QUESTIONNAIRE - PHQ9
2. FEELING DOWN, DEPRESSED OR HOPELESS: NOT AT ALL
SUM OF ALL RESPONSES TO PHQ9 QUESTIONS 1 AND 2: 0
1. LITTLE INTEREST OR PLEASURE IN DOING THINGS: NOT AT ALL

## 2025-04-23 ASSESSMENT — PAIN - FUNCTIONAL ASSESSMENT: PAIN_FUNCTIONAL_ASSESSMENT: 0-10

## 2025-04-23 ASSESSMENT — PAIN SCALES - GENERAL
PAINLEVEL_OUTOF10: 4
PAINLEVEL_OUTOF10: 4

## 2025-04-23 ASSESSMENT — PAIN DESCRIPTION - DESCRIPTORS: DESCRIPTORS: NAGGING
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
## 2025-04-24 ENCOUNTER — DOCUMENTATION (OUTPATIENT)
Dept: PHYSICAL THERAPY | Facility: CLINIC | Age: 60
End: 2025-04-24
Payer: MEDICAID

## 2025-04-24 NOTE — PROGRESS NOTES
"Physical Therapy                 Therapy Communication Note    Patient Name: Ashley Kessler \"Ted\"  MRN: [id number]  Department:   Room: Room/bed info not found  Today's Date: 4/24/2025     Discipline: Physical Therapy        Missed Time: No Show    Comment:  "

## 2025-04-29 ENCOUNTER — DOCUMENTATION (OUTPATIENT)
Dept: PHYSICAL THERAPY | Facility: CLINIC | Age: 60
End: 2025-04-29
Payer: MEDICAID

## 2025-04-29 NOTE — PROGRESS NOTES
"Physical Therapy                 Therapy Communication Note    Patient Name: Ashley Kessler \"Ted\"  MRN: [id number]  Department:   Room: Room/bed info not found  Today's Date: 4/29/2025     Discipline: Physical Therapy          Missed Visit Reason:  forgot    Missed Time: No Show    Comment:  "

## 2025-05-14 ENCOUNTER — DOCUMENTATION (OUTPATIENT)
Dept: PHYSICAL THERAPY | Facility: CLINIC | Age: 60
End: 2025-05-14
Payer: MEDICAID

## 2025-05-14 NOTE — PROGRESS NOTES
"Physical Therapy                 Therapy Communication Note    Patient Name: Ashley Kessler \"Ted\"  MRN: [id number]  Department:   Room: Room/bed info not found  Today's Date: 5/14/2025     Discipline: Physical Therapy    Missed Visit:       Missed Visit Reason: Patient did not check in for her appointment and did not cancel    Missed Time: No Show    Comment: No Show #3 consecutively      "

## 2025-05-16 ENCOUNTER — DOCUMENTATION (OUTPATIENT)
Dept: PHYSICAL THERAPY | Facility: CLINIC | Age: 60
End: 2025-05-16
Payer: MEDICAID

## 2025-05-16 NOTE — PROGRESS NOTES
"Physical Therapy    Discharge Summary    Name: Ashley Kessler \"Ted\"  MRN: [id number]  : 1965  Date: 25    Discharge Summary: PT    Discharge Information: Date of discharge 25    Therapy Summary: pt did well in aquatic PT    Discharge Status: pt states she is doing well and understands HEP to do on land     Rehab Discharge Reason: Patient requested due to doing well  "

## 2025-05-20 ENCOUNTER — APPOINTMENT (OUTPATIENT)
Dept: PHYSICAL THERAPY | Facility: CLINIC | Age: 60
End: 2025-05-20
Payer: MEDICAID

## 2025-05-30 ENCOUNTER — APPOINTMENT (OUTPATIENT)
Dept: PHYSICAL THERAPY | Facility: CLINIC | Age: 60
End: 2025-05-30
Payer: MEDICAID